# Patient Record
Sex: FEMALE | Race: WHITE | NOT HISPANIC OR LATINO | Employment: OTHER | URBAN - METROPOLITAN AREA
[De-identification: names, ages, dates, MRNs, and addresses within clinical notes are randomized per-mention and may not be internally consistent; named-entity substitution may affect disease eponyms.]

---

## 2018-06-18 ENCOUNTER — TELEPHONE (OUTPATIENT)
Dept: NEUROSURGERY | Facility: CLINIC | Age: 58
End: 2018-06-18

## 2018-06-18 ENCOUNTER — OFFICE VISIT (OUTPATIENT)
Dept: NEUROSURGERY | Facility: CLINIC | Age: 58
End: 2018-06-18
Payer: MEDICARE

## 2018-06-18 VITALS
HEART RATE: 80 BPM | TEMPERATURE: 99 F | DIASTOLIC BLOOD PRESSURE: 93 MMHG | WEIGHT: 151.69 LBS | SYSTOLIC BLOOD PRESSURE: 191 MMHG

## 2018-06-18 DIAGNOSIS — Q28.2 CEREBRAL ARTERIOVENOUS MALFORMATION: Primary | ICD-10-CM

## 2018-06-18 DIAGNOSIS — Q28.2 CEREBRAL ARTERIOVENOUS MALFORMATION (AVM): ICD-10-CM

## 2018-06-18 DIAGNOSIS — I10 ESSENTIAL HYPERTENSION: ICD-10-CM

## 2018-06-18 DIAGNOSIS — Q28.2 CEREBRAL ARTERIOVENOUS MALFORMATION: ICD-10-CM

## 2018-06-18 DIAGNOSIS — M54.16 LUMBAR RADICULOPATHY: Primary | ICD-10-CM

## 2018-06-18 DIAGNOSIS — G95.20 CERVICAL CORD COMPRESSION WITH MYELOPATHY: ICD-10-CM

## 2018-06-18 DIAGNOSIS — G95.89 MYELOMALACIA OF CERVICAL CORD: ICD-10-CM

## 2018-06-18 PROCEDURE — 99203 OFFICE O/P NEW LOW 30 MIN: CPT | Mod: PBBFAC | Performed by: NEUROLOGICAL SURGERY

## 2018-06-18 PROCEDURE — 99204 OFFICE O/P NEW MOD 45 MIN: CPT | Mod: S$PBB,,, | Performed by: NEUROLOGICAL SURGERY

## 2018-06-18 PROCEDURE — 99999 PR PBB SHADOW E&M-NEW PATIENT-LVL III: CPT | Mod: PBBFAC,,, | Performed by: NEUROLOGICAL SURGERY

## 2018-06-18 RX ORDER — NORTRIPTYLINE HYDROCHLORIDE 10 MG/1
CAPSULE ORAL
Refills: 3 | COMMUNITY
Start: 2018-03-13 | End: 2018-07-23

## 2018-06-18 RX ORDER — FLURBIPROFEN 100 MG/1
100 TABLET, FILM COATED ORAL 2 TIMES DAILY
Refills: 2 | COMMUNITY
Start: 2018-03-21 | End: 2018-07-23

## 2018-06-18 RX ORDER — ORPHENADRINE CITRATE 100 MG/1
100 TABLET, EXTENDED RELEASE ORAL 2 TIMES DAILY
Refills: 0 | COMMUNITY
Start: 2018-04-30 | End: 2018-07-23

## 2018-06-18 RX ORDER — CARISOPRODOL 350 MG/1
350 TABLET ORAL 3 TIMES DAILY
Refills: 2 | COMMUNITY
Start: 2018-05-05 | End: 2018-07-23

## 2018-06-18 RX ORDER — CEFDINIR 300 MG/1
300 CAPSULE ORAL 2 TIMES DAILY
Refills: 0 | COMMUNITY
Start: 2018-06-13 | End: 2018-07-23

## 2018-06-18 RX ORDER — DIAZEPAM 10 MG/1
TABLET ORAL
Refills: 0 | COMMUNITY
Start: 2018-05-16 | End: 2018-07-23

## 2018-06-18 NOTE — PROGRESS NOTES
History & Physical    I, Zoran Rodriguez, attest that this documentation has been prepared under the direction and in the presence of Rico Marion MD.    06/18/2018    Chief Complaint   Patient presents with    Consult       History of Present Illness:  Ida Garduno is a 57 y.o. patient with history of hypertension, smoking, chronic back pain, chronic neck pain. Patient was referred to me by Dr. Timoteo Negrete MD for evaluation of her cervical spine and lumbar spine. Patient reports her symptoms as headache, neck pain, back pain.     Patient reports constant, daily headache located to her occipital area. Patient denies any positional component or exertional component. She states that her headache has been present for 4 months, with immediate onset after she went for lumbar epidural steroid injections for back pain. Patient denies any valsalva headaches.      Patient also complains of neck pain, described as sharp shooting pain that radiates down her left shoulder and down her left scapular area. Patient denies any hand numbness. No alleviating factors are identifiable by the patient. Exacerbating factors include activities such as mopping and sweeping, which worsen both her neck pain and back pain. Past treatments include physical therapy. Patient has taken Narcotics in the past, but is no longer being prescribed narcotics. Associated signs and symptoms are positive for dropping objects from her left hand only for the last few years, positive for gait instability. Patient denies any falling within the last 6 months but endorses multiple near falls. She denies noticing any sloppier handwriting (patient is right hand dominant). Patient denies any bowel / bladder incontinence.     Patient reports history of previous back surgery in 1999. Patient claims that this was a L4-5 fusion. No history of previous neck surgery. Patient reports history of smoking (less than 1 ppd).     Review of patient's allergies  indicates:   Allergen Reactions    Codeine      Other reaction(s): Unknown    Pheniramine        Current Outpatient Prescriptions   Medication Sig Dispense Refill    cefdinir (OMNICEF) 300 MG capsule Take 300 mg by mouth 2 (two) times daily.  0    carisoprodol (SOMA) 350 MG tablet Take 350 mg by mouth 3 (three) times daily.  2    diazePAM (VALIUM) 10 MG Tab TAKE ONE TABLET BY MOUTH 30 MINUTES PRIOR TO MRI PROCEDURE  0    flurbiprofen (ANSAID) 100 MG tablet Take 100 mg by mouth 2 (two) times daily.  2    nortriptyline (PAMELOR) 10 MG capsule TAKE 1 TAB every night at bedtime for 3 days, THEN 1 TAB TWICE DAILY for 3 days, THEN 1 TAB THREE TIMES DAILY  3    orphenadrine (NORFLEX) 100 mg tablet Take 100 mg by mouth 2 (two) times daily.  0     No current facility-administered medications for this visit.        History reviewed. No pertinent past medical history.    Past Surgical History:   Procedure Laterality Date    APPENDECTOMY      13yrs     HIP ARTHROSCOPY W/ LABRAL REPAIR Right 2016    HYSTERECTOMY  2007    KIDNEY STONE SURGERY  1980    SPINE SURGERY  1999       History reviewed. No pertinent family history.    Social History   Substance Use Topics    Smoking status: Current Some Day Smoker     Packs/day: 1.00     Types: Cigarettes     Start date: 6/18/1982    Smokeless tobacco: Current User    Alcohol use 3.0 oz/week     5 Shots of liquor per week      Comment: social         Review of Systems:  Review of Systems   Constitutional: Negative for activity change.   HENT: Negative for congestion.    Eyes: Negative for discharge.   Respiratory: Negative for apnea.    Cardiovascular: Negative for chest pain.   Gastrointestinal: Negative for abdominal distention and constipation.   Endocrine: Negative for cold intolerance.   Genitourinary: Negative for difficulty urinating and enuresis.   Musculoskeletal: Positive for back pain and neck pain.   Neurological: Positive for headaches. Negative for dizziness  and weakness.        Patient complains of sharp shooting pain to her left shoulder and scapular area.    Psychiatric/Behavioral: Negative for agitation.       Vital Signs (Most Recent)  Temp: 98.5 °F (36.9 °C) (06/18/18 0836)  Pulse: 80 (06/18/18 0836)  BP: (!) 191/93 (06/18/18 0836)     68.8 kg (151 lb 11.2 oz)       Physical Exam:  Physical Exam:    Constitutional: She appears well-developed.     Eyes: Pupils are equal, round, and reactive to light. EOM are normal. Right eye exhibits no discharge. Left eye exhibits no discharge.     Abdominal: Soft.     Skin: Skin displays no rash on trunk and no rash on extremities.     Psych/Behavior: She is alert. She is oriented to person, place, and time.     Musculoskeletal:        Neck: There is no tenderness.        Back: Range of motion is full.        Right Upper Extremities: Range of motion is full. Muscle strength is 5/5. Tone is normal.        Left Upper Extremities: Range of motion is full. Muscle strength is 5/5. Tone is normal.       Right Lower Extremities: Range of motion is full. Muscle strength is 5/5. Tone is normal.        Left Lower Extremities: Range of motion is full. Muscle strength is 5/5. Tone is normal.     Neurological:        Coordination: She has an abnormal Romberg Test. She has normal finger to nose coordination and normal tandem walking coordination.        Sensory: There is no sensory deficit in the trunk. There is no sensory deficit in the extremities.        DTRs: DTRs are DTRS NORMAL AND SYMMETRICnormal and symmetric. Tricep reflexes are 2+ on the right side and 2+ on the left side. Bicep reflexes are 2+ on the right side and 2+ on the left side. Brachioradialis reflexes are 2+ on the right side and 2+ on the left side. Patellar reflexes are 2+ on the right side and 2+ on the left side. Achilles reflexes are 2+ on the right side and 2+ on the left side. She displays no Babinski's sign on the right side. She displays no Babinski's sign on the  left side.        Cranial nerves: Cranial nerve(s) II, III, IV, V, VI, VII, VIII, IX, X, XI and XII are intact.     Positive Myers's on the left side.   Positive Romberg.   Normal tandem gait.   Positive straight leg raising test on the right side.   Negative straight leg raising test on the left.   Negative Juan's test bilaterally.   5/5 strength throughout.     Negative clonus bilaterally   Negative Babinski's bilaterally.   Sensation intact to light touch throughout bilateral lower extremities.   Negative SI joint pain.   No dysmetria.       Laboratory  none    Diagnostic Results:  MRI: Reviewed   MRI Brain and Cervical spine: I have personally reviewed images and explained them to the patient.     MRI of the brain shows large AVM in the superior aspect of vermis right at the quadrigeminal system.     MRI of the cervical spine shows C2-3, C3-4, C4-5, C5-6, C6-7 spondylosis with worse cervical stenosis at C4-5 with early myelomalacia changes over the left-sided spinal cord.     Patient does not have any images of her lumbar spine.     ASSESSMENT/PLAN:       ICD-10-CM ICD-9-CM   1. Cerebral arteriovenous malformation (AVM) Q28.2 747.81   2. Cervical cord compression with myelopathy G95.20 336.9   3. Myelomalacia of cervical cord G95.89 336.8   4. Essential hypertension I10 401.9       PLAN:    1. Cerebellar AVM, non-ruptured.     I have discussed the natural history of brain arteriovenous malformations, its 2% risk of rupture per year and the 10% mortality rate and 30% morbidity rate with each rupture. Having 57 years of age, the patient is at 20 % risk of rupture in the rest of her life.     At this point, I recommend cerebral angiogram to better study this AVM and let the patient decide if she wants to pursue treatment.     2. Consent is provided for a cerebral angiogram, for evaluation of cerebellar AVM.    Risks / Benefits of treatment VS no treatment were discussed in layman's terms. Risks included but  are not limited to: bleeding, infection, loss of limb, abdominal hemorrhage, retroperitoneal hematoma, renal failure, speech and vision deficits, stroke, brain hemorrhage, paralysis, coma and death.     Ample time was provided for question.     Opportunity for a second opinion was given.     The patient / family wishes to proceed at this point.     3. Cervical spondylosis with early myelopathy symptoms. Patient with C2-3, C3-4, C4-5, C5-6, C6-7 spondylosis with worse cervical stenosis at C4-5 with early myelomalacia changes over the left-sided spinal cord.     I have explained the natural history of spondylotic myelopathy, the stepwise decline in neurological function over time, and the role of surgery to prevent further neurological decline but not to improve the already lost neurological functions.  Patient understands this natural history, and is aware of signs and symptoms of progression, including worsening gait and balance, loss of bb function, weakness, loss of fine motor skills.      Patient has an orthopedic surgeon following for early myelopathy. At this point, she will continue to follow up with her orthopedic surgeon.    4. S/p lumbar spinal fusion. At this point, we will get an MRI without contrast of the lumbar spine.     5. I spent 35 minutes with the patient, 50% of time in counselingabout the above mentioned issues.    6. Patient to f/u with PCP for BP control. Ideally, SBP will need to be below 160.        [place] was seen today for consult.    Diagnoses and all orders for this visit:    Cerebral arteriovenous malformation (AVM)    Cervical cord compression with myelopathy    Myelomalacia of cervical cord    Essential hypertension    I, Dr. Rico Marion, personally performed the services described in this documentation. All medical record entries made by the scribe were at my direction and in my presence.  I have reviewed the chart and agree that the record reflects my personal performance  and is accurate and complete. Rico Marion MD.  9:06 PM 06/18/2018

## 2018-06-18 NOTE — LETTER
June 18, 2018      Timoteo Negrete MD  [address] [place] [address]           [facility] - Neurosurgery 7th Fl  [address]  [place] [address]  Phone: [phone]          Patient: Ida Garduno   MR Number: [id number]   YOB: 1960   Date of Visit: 6/18/2018       Dear Dr. Timoteo Negrete:    Thank you for referring Ida Garduno to me for evaluation. Attached you will find relevant portions of my assessment and plan of care.    If you have questions, please do not hesitate to call me. I look forward to following Ida Garduno along with you.    Sincerely,    Rico Marion MD    Enclosure  CC:  No Recipients    If you would like to receive this communication electronically, please contact [email or web address] or [phone] to request more information on [employer] access.    For providers and/or their staff who would like to refer a patient to [facility], please contact us through our one-stop-shop provider referral line, [facility] , at [phone].    If you feel you have received this communication in error or would no longer like to receive these types of communications, please e-mail [email or web address]

## 2018-07-05 ENCOUNTER — HOSPITAL ENCOUNTER (OUTPATIENT)
Facility: HOSPITAL | Age: 58
Discharge: HOME | End: 2018-07-05
Attending: NEUROLOGICAL SURGERY | Admitting: NEUROLOGICAL SURGERY
Payer: MEDICARE

## 2018-07-05 ENCOUNTER — HOSPITAL ENCOUNTER (OUTPATIENT)
Dept: RADIOLOGY | Facility: HOSPITAL | Age: 58
Discharge: HOME | End: 2018-07-05
Attending: NEUROLOGICAL SURGERY
Payer: MEDICARE

## 2018-07-05 VITALS
SYSTOLIC BLOOD PRESSURE: 141 MMHG | DIASTOLIC BLOOD PRESSURE: 64 MMHG | RESPIRATION RATE: 18 BRPM | BODY MASS INDEX: 24.99 KG/M2 | OXYGEN SATURATION: 95 % | WEIGHT: 150 LBS | TEMPERATURE: 98 F | HEIGHT: 65 IN | HEART RATE: 90 BPM

## 2018-07-05 DIAGNOSIS — Q28.2 CEREBRAL ARTERIOVENOUS MALFORMATION: ICD-10-CM

## 2018-07-05 DIAGNOSIS — M54.16 LUMBAR RADICULOPATHY: ICD-10-CM

## 2018-07-05 LAB
ALBUMIN SERPL BCP-MCNC: 3.4 G/DL
ALP SERPL-CCNC: 107 U/L
ALT SERPL W/O P-5'-P-CCNC: 17 U/L
ANION GAP SERPL CALC-SCNC: 7 MMOL/L
ANISOCYTOSIS BLD QL SMEAR: SLIGHT
AST SERPL-CCNC: 16 U/L
BASOPHILS # BLD AUTO: 0.04 K/UL
BASOPHILS NFR BLD: 0.6 %
BILIRUB SERPL-MCNC: 0.9 MG/DL
BUN SERPL-MCNC: 13 MG/DL
CALCIUM SERPL-MCNC: 8.6 MG/DL
CHLORIDE SERPL-SCNC: 108 MMOL/L
CO2 SERPL-SCNC: 27 MMOL/L
CREAT SERPL-MCNC: 0.6 MG/DL
DIFFERENTIAL METHOD: ABNORMAL
EOSINOPHIL # BLD AUTO: 0.2 K/UL
EOSINOPHIL NFR BLD: 3.2 %
ERYTHROCYTE [DISTWIDTH] IN BLOOD BY AUTOMATED COUNT: 13.5 %
EST. GFR  (AFRICAN AMERICAN): >60 ML/MIN/1.73 M^2
EST. GFR  (NON AFRICAN AMERICAN): >60 ML/MIN/1.73 M^2
GLUCOSE SERPL-MCNC: 108 MG/DL
HCT VFR BLD AUTO: 44.6 %
HGB BLD-MCNC: 15 G/DL
IMM GRANULOCYTES # BLD AUTO: 0.01 K/UL
IMM GRANULOCYTES NFR BLD AUTO: 0.2 %
INR PPP: 1.1
LYMPHOCYTES # BLD AUTO: 1.7 K/UL
LYMPHOCYTES NFR BLD: 25.3 %
MCH RBC QN AUTO: 31.6 PG
MCHC RBC AUTO-ENTMCNC: 33.6 G/DL
MCV RBC AUTO: 94 FL
MONOCYTES # BLD AUTO: 0.6 K/UL
MONOCYTES NFR BLD: 9 %
NEUTROPHILS # BLD AUTO: 4.1 K/UL
NEUTROPHILS NFR BLD: 61.7 %
NRBC BLD-RTO: 0 /100 WBC
PLATELET # BLD AUTO: 208 K/UL
PLATELET BLD QL SMEAR: ABNORMAL
PMV BLD AUTO: 10.6 FL
POTASSIUM SERPL-SCNC: 3.5 MMOL/L
PROT SERPL-MCNC: 6.1 G/DL
PROTHROMBIN TIME: 11.7 SEC
RBC # BLD AUTO: 4.74 M/UL
SODIUM SERPL-SCNC: 142 MMOL/L
WBC # BLD AUTO: 6.64 K/UL

## 2018-07-05 PROCEDURE — 85610 PROTHROMBIN TIME: CPT

## 2018-07-05 PROCEDURE — 25500020 PHARM REV CODE 255: Performed by: NEUROLOGICAL SURGERY

## 2018-07-05 PROCEDURE — 85025 COMPLETE CBC W/AUTO DIFF WBC: CPT

## 2018-07-05 PROCEDURE — 72148 MRI LUMBAR SPINE W/O DYE: CPT | Mod: TC

## 2018-07-05 PROCEDURE — 80053 COMPREHEN METABOLIC PANEL: CPT

## 2018-07-05 PROCEDURE — 63600175 PHARM REV CODE 636 W HCPCS: Performed by: FAMILY MEDICINE

## 2018-07-05 PROCEDURE — 72148 MRI LUMBAR SPINE W/O DYE: CPT | Mod: 26,,, | Performed by: RADIOLOGY

## 2018-07-05 PROCEDURE — 63600175 PHARM REV CODE 636 W HCPCS: Performed by: NEUROLOGICAL SURGERY

## 2018-07-05 RX ORDER — FENTANYL CITRATE 50 UG/ML
50 INJECTION, SOLUTION INTRAMUSCULAR; INTRAVENOUS
Status: DISCONTINUED | OUTPATIENT
Start: 2018-07-05 | End: 2018-07-06 | Stop reason: HOSPADM

## 2018-07-05 RX ORDER — HEPARIN SODIUM 1000 [USP'U]/ML
3000 INJECTION, SOLUTION INTRAVENOUS; SUBCUTANEOUS ONCE
Status: DISCONTINUED | OUTPATIENT
Start: 2018-07-05 | End: 2018-07-06 | Stop reason: HOSPADM

## 2018-07-05 RX ORDER — HYDRALAZINE HYDROCHLORIDE 20 MG/ML
INJECTION INTRAMUSCULAR; INTRAVENOUS CODE/TRAUMA/SEDATION MEDICATION
Status: COMPLETED | OUTPATIENT
Start: 2018-07-05 | End: 2018-07-05

## 2018-07-05 RX ORDER — IODIXANOL 320 MG/ML
200 INJECTION, SOLUTION INTRAVASCULAR
Status: COMPLETED | OUTPATIENT
Start: 2018-07-05 | End: 2018-07-05

## 2018-07-05 RX ORDER — MIDAZOLAM HYDROCHLORIDE 1 MG/ML
1 INJECTION INTRAMUSCULAR; INTRAVENOUS
Status: DISCONTINUED | OUTPATIENT
Start: 2018-07-05 | End: 2018-07-06 | Stop reason: HOSPADM

## 2018-07-05 RX ORDER — SODIUM CHLORIDE 9 MG/ML
500 INJECTION, SOLUTION INTRAVENOUS ONCE
Status: DISCONTINUED | OUTPATIENT
Start: 2018-07-05 | End: 2018-07-06 | Stop reason: HOSPADM

## 2018-07-05 RX ORDER — FENTANYL CITRATE 50 UG/ML
INJECTION, SOLUTION INTRAMUSCULAR; INTRAVENOUS CODE/TRAUMA/SEDATION MEDICATION
Status: COMPLETED | OUTPATIENT
Start: 2018-07-05 | End: 2018-07-05

## 2018-07-05 RX ORDER — MIDAZOLAM HYDROCHLORIDE 1 MG/ML
INJECTION INTRAMUSCULAR; INTRAVENOUS CODE/TRAUMA/SEDATION MEDICATION
Status: COMPLETED | OUTPATIENT
Start: 2018-07-05 | End: 2018-07-05

## 2018-07-05 RX ORDER — SODIUM CHLORIDE 0.9 % (FLUSH) 0.9 %
5 SYRINGE (ML) INJECTION
Status: DISCONTINUED | OUTPATIENT
Start: 2018-07-05 | End: 2018-07-06 | Stop reason: HOSPADM

## 2018-07-05 RX ADMIN — MIDAZOLAM HYDROCHLORIDE 1 MG: 1 INJECTION, SOLUTION INTRAMUSCULAR; INTRAVENOUS at 08:07

## 2018-07-05 RX ADMIN — HYDRALAZINE HYDROCHLORIDE 10 MG: 20 INJECTION INTRAMUSCULAR; INTRAVENOUS at 08:07

## 2018-07-05 RX ADMIN — FENTANYL CITRATE 50 MCG: 50 INJECTION, SOLUTION INTRAMUSCULAR; INTRAVENOUS at 08:07

## 2018-07-05 RX ADMIN — FENTANYL CITRATE 50 MCG: 50 INJECTION, SOLUTION INTRAMUSCULAR; INTRAVENOUS at 09:07

## 2018-07-05 RX ADMIN — MIDAZOLAM HYDROCHLORIDE 1 MG: 1 INJECTION, SOLUTION INTRAMUSCULAR; INTRAVENOUS at 09:07

## 2018-07-05 RX ADMIN — IODIXANOL 110 ML: 320 INJECTION, SOLUTION INTRAVASCULAR at 09:07

## 2018-07-05 RX ADMIN — HYDRALAZINE HYDROCHLORIDE 10 MG: 20 INJECTION INTRAMUSCULAR; INTRAVENOUS at 09:07

## 2018-07-05 NOTE — PROGRESS NOTES
Pt arrived to [facility] bay 1 s/p cerebral angiogram.  NAD noted.  Report received from ISAAC Velez.  DSG to left groin CDI.  Will continue to monitor.

## 2018-07-05 NOTE — DISCHARGE INSTRUCTIONS
For scheduling: Call Elizabeth at [phone]    For questions or concerns call: CARLA MON-FRI 8 AM- 5PM [phone]. Radiology resident on call [phone].    For immediate concerns that are not emergent, you may call our radiology clinic at: [phone]

## 2018-07-05 NOTE — H&P
[facility]  Neurosurgery  History & Physical    Patient Name: Ida Garduno  MRN: [id number]      Subjective:     Chief Complaint/Reason for Admission: AVM    History of Present Illness:   Ida Garduno is a 57 y.o. patient with history of hypertension, smoking, chronic back pain, chronic neck pain. Patient was referred to me by Dr. Timoteo Negrete MD for evaluation of her cervical spine and lumbar spine. Patient reports her symptoms as headache, neck pain, back pain.   Patient reports constant, daily headache located to her occipital area. Patient denies any positional component or exertional component. She states that her headache has been present for 4 months, with immediate onset after she went for lumbar epidural steroid injections for back pain. Patient denies any valsalva headaches.  Pt also with c/o for dropping objects from her left hand only for the last few years, positive for gait instability. Patient denies any falling within the last 6 months but endorses multiple near falls. She denies noticing any sloppier handwriting (patient is right hand dominant). Patient denies any bowel / bladder incontinence.     Pt without any new complaints since last visit with Dr. Garcia    PTA Medications   Medication Sig    carisoprodol (SOMA) 350 MG tablet Take 350 mg by mouth 3 (three) times daily.    cefdinir (OMNICEF) 300 MG capsule Take 300 mg by mouth 2 (two) times daily.    diazePAM (VALIUM) 10 MG Tab TAKE ONE TABLET BY MOUTH 30 MINUTES PRIOR TO MRI PROCEDURE    flurbiprofen (ANSAID) 100 MG tablet Take 100 mg by mouth 2 (two) times daily.    nortriptyline (PAMELOR) 10 MG capsule TAKE 1 TAB every night at bedtime for 3 days, THEN 1 TAB TWICE DAILY for 3 days, THEN 1 TAB THREE TIMES DAILY    orphenadrine (NORFLEX) 100 mg tablet Take 100 mg by mouth 2 (two) times daily.       Review of patient's allergies indicates:   Allergen Reactions    Codeine      Other reaction(s):  Unknown    Pheniramine        No past medical history on file.  Past Surgical History:   Procedure Laterality Date    APPENDECTOMY      13yrs     HIP ARTHROSCOPY W/ LABRAL REPAIR Right 2016    HYSTERECTOMY  2007    KIDNEY STONE SURGERY  1980    SPINE SURGERY  1999     Family History     None        Social History Main Topics    Smoking status: Current Some Day Smoker     Packs/day: 1.00     Types: Cigarettes     Start date: 6/18/1982    Smokeless tobacco: Current User    Alcohol use 3.0 oz/week     5 Shots of liquor per week      Comment: social     Drug use: No    Sexual activity: Yes     Partners: Male     Review of Systems   Review of Systems   Constitutional: Negative for activity change.   HENT: Negative for congestion.    Eyes: Negative for discharge.   Respiratory: Negative for apnea.    Cardiovascular: Negative for chest pain.   Gastrointestinal: Negative for abdominal distention and constipation.   Endocrine: Negative for cold intolerance.   Genitourinary: Negative for difficulty urinating and enuresis.   Musculoskeletal: Positive for back pain and neck pain.   Neurological: Positive for headaches. Negative for dizziness and weakness.        Patient complains of sharp shooting pain to her left shoulder and scapular area.    Psychiatric/Behavioral: Negative for agitation.     Objective:     Weight: 68 kg (150 lb)  Body mass index is 24.96 kg/m².  Vital Signs (Most Recent):    Vital Signs (24h Range):                         Neurosurgery Physical Exam   Constitutional: She appears well-developed.      Eyes: Pupils are equal, round, and reactive to light. EOM are normal. Right eye exhibits no discharge. Left eye exhibits no discharge.      Abdominal: Soft.      Skin: Skin displays no rash on trunk and no rash on extremities.     Psych/Behavior: She is alert. She is oriented to person, place, and time.     Musculoskeletal:        Neck: There is no tenderness.        Back: Range of motion is full.         Right Upper Extremities: Range of motion is full. Muscle strength is 5/5. Tone is normal.        Left Upper Extremities: Range of motion is full. Muscle strength is 5/5. Tone is normal.       Right Lower Extremities: Range of motion is full. Muscle strength is 5/5. Tone is normal.        Left Lower Extremities: Range of motion is full. Muscle strength is 5/5. Tone is normal.     Neurological:        Coordination: She has an abnormal Romberg Test. She has normal finger to nose coordination and normal tandem walking coordination.        Sensory: There is no sensory deficit in the trunk. There is no sensory deficit in the extremities.        DTRs: DTRs are DTRS NORMAL AND SYMMETRICnormal and symmetric. Tricep reflexes are 2+ on the right side and 2+ on the left side. Bicep reflexes are 2+ on the right side and 2+ on the left side. Brachioradialis reflexes are 2+ on the right side and 2+ on the left side. Patellar reflexes are 2+ on the right side and 2+ on the left side. Achilles reflexes are 2+ on the right side and 2+ on the left side. She displays no Babinski's sign on the right side. She displays no Babinski's sign on the left side.        Cranial nerves: Cranial nerve(s) II, III, IV, V, VI, VII, VIII, IX, X, XI and XII are intact.      Positive Myers's on the left side.   Positive Romberg.   Normal tandem gait.   Positive straight leg raising test on the right side.   Negative straight leg raising test on the left.   Negative Juan's test bilaterally.   5/5 strength throughout.      Negative clonus bilaterally   Negative Babinski's bilaterally.   Sensation intact to light touch throughout bilateral lower extremities.   Negative SI joint pain.   No dysmetria.         Assessment/Plan:     Active Diagnoses:    Diagnosis Date Noted POA    Cerebral arteriovenous malformation [Q28.2] 07/05/2018 Not Applicable      Problems Resolved During this Admission:    Diagnosis Date Noted Date Resolved POA     56 yo female  with non ruptured Cerebellar AVM   Mallampati 2  ASA 2    - Pt is neurologically stable  - Diagnostic cerebral angiogram today  - CBC, CMP, INR this morning prior to anigogram  - Discussed with ARINA Sahni  Neurosurgery  [facility]

## 2018-07-05 NOTE — H&P
Radiology History & Physical      SUBJECTIVE:     Chief Complaint: headache and neck pain     History of Present Illness:  Ida Garduno is a 57 y.o. female with cerebellar AVM found during work-up of above symptoms who presents for diagnostic cerebral angiogram.  No past medical history on file.  Past Surgical History:   Procedure Laterality Date    APPENDECTOMY      13yrs     HIP ARTHROSCOPY W/ LABRAL REPAIR Right 2016    HYSTERECTOMY  2007    KIDNEY STONE SURGERY  1980    SPINE SURGERY  1999       Home Meds:   Prior to Admission medications    Medication Sig Start Date End Date Taking? Authorizing Provider   carisoprodol (SOMA) 350 MG tablet Take 350 mg by mouth 3 (three) times daily. 5/5/18   Historical Provider, MD   cefdinir (OMNICEF) 300 MG capsule Take 300 mg by mouth 2 (two) times daily. 6/13/18   Historical Provider, MD   diazePAM (VALIUM) 10 MG Tab TAKE ONE TABLET BY MOUTH 30 MINUTES PRIOR TO MRI PROCEDURE 5/16/18   Historical Provider, MD   flurbiprofen (ANSAID) 100 MG tablet Take 100 mg by mouth 2 (two) times daily. 3/21/18   Historical Provider, MD   nortriptyline (PAMELOR) 10 MG capsule TAKE 1 TAB every night at bedtime for 3 days, THEN 1 TAB TWICE DAILY for 3 days, THEN 1 TAB THREE TIMES DAILY 3/13/18   Historical Provider, MD   orphenadrine (NORFLEX) 100 mg tablet Take 100 mg by mouth 2 (two) times daily. 4/30/18   Historical Provider, MD     Anticoagulants/Antiplatelets: no anticoagulation    Allergies:   Review of patient's allergies indicates:   Allergen Reactions    Codeine      Other reaction(s): Unknown    Pheniramine      Sedation History:  no adverse reactions    Review of Systems:   Hematological: no known coagulopathies  Respiratory: no shortness of breath  Cardiovascular: no chest pain  Gastrointestinal: no abdominal pain  Genito-Urinary: no dysuria  Musculoskeletal:   Back pain and nekc apn  Neurological: no TIA or stroke symptoms, +headaches         OBJECTIVE:     Vital  Signs (Most Recent)       Physical Exam:  ASA: 2  Mallampati: 3    General: no acute distress  Mental Status: alert and oriented to person, place and time  Neuro:extraocular eye movements intact, pupils equal round and reactive; no facial asymmetry, 5/5 strength upper and lower extremities  HEENT: normocephalic, atraumatic  Chest: unlabored breathing  Heart: regular heart rate  Abdomen: nondistended  Extremity: moves all extremities    Laboratory  No results found for: INR  No results found for: WBC, HGB, HCT, MCV, PLT No results found for: GLU, NA, K, CL, CO2, BUN, CREATININE, CALCIUM, MG, ALT, AST, ALBUMIN, BILITOT, BILIDIR    ASSESSMENT/PLAN:     Sedation Plan: moderate  Patient will undergo diagnostic cerebral angiogram.    Faizan Toledo  Radiology PGY-5

## 2018-07-05 NOTE — PROGRESS NOTES
Cerebral angiogram completed, pt tolerated well. No apparent distress noted. EXOseal deployed, HOB to be elevated at 1120. Dressing applied CDI. Pt to be transferred to [facility], report to be given at bedside.

## 2018-07-05 NOTE — PROGRESS NOTES
Pt arrived to IR room 190 for cerebral angiogram, no acute distress noted. Orders and labs reviewed on chart.

## 2018-07-05 NOTE — PROCEDURES
Radiology Post-Procedure Note    Pre Op Diagnosis: cerebellar AVM    Post Op Diagnosis: Spetzler Oracio Grade 3 cerebellar AVM    Procedure: Cerebral angiogram    Procedure performed by: Dr. Rico Garcia; Fazian Toledo (resident)      Written Informed Consent Obtained: Yes    Specimen Removed: NO    Estimated Blood Loss: Minimal    Procedure report:     A 5F sheath was placed into the left femoral artery and a 5F Berensteincatheter was advanced into the aortic arch.  The common common carotid and verterbal arteries were subselected and angiography of the brain was performed after injection into each of these vessels.    Preliminary interpretation: Grade 3 cerebellar   AVM with supply from both superior cerebellar arteries and left posterior inferior cerebellar artery and venous drainage primarily via the straight sinus.  Please see Imaging report for full details.    A left femoral artery angiogram was performed, the sheath removed and hemostasis achieved using Exoseal devide.  No hematoma was present at the time of hemostasis.    The patient tolerated the procedure well.     Faizan Toledo  Radiology PGY-5

## 2018-07-05 NOTE — PROGRESS NOTES
Pre-Procedure assessment and documentation complete. Awaiting lab results to begin procedure (Pt and family aware). No complaints at this time.

## 2018-07-05 NOTE — PROGRESS NOTES
Pt given discharge instructions and handout, verbalized understanding. Family at bedside.  Dsg to left groin CDI.  IV d/c'd with cath tip intact.  NAD noted.  Pt to proceed to MRI appt. Via wheelchair.

## 2018-07-06 ENCOUNTER — TELEPHONE (OUTPATIENT)
Dept: NEUROSURGERY | Facility: CLINIC | Age: 58
End: 2018-07-06

## 2018-07-06 DIAGNOSIS — Q27.30 AVM (ARTERIOVENOUS MALFORMATION): Primary | ICD-10-CM

## 2018-07-06 DIAGNOSIS — Q28.2 AVM (ARTERIOVENOUS MALFORMATION) BRAIN: ICD-10-CM

## 2018-07-06 NOTE — DISCHARGE SUMMARY
Radiology Discharge Summary      Hospital Course: No complications    Admit Date: 7/5/2018  Discharge Date: 07/06/2018     Instructions Given to Patient: Yes  Diet: Resume prior diet  Activity: activity as tolerated    Description of Condition on Discharge: Stable  Vital Signs (Most Recent): Temp: 97.9 °F (36.6 °C) (07/05/18 0930)  Pulse: 90 (07/05/18 1130)  Resp: 18 (07/05/18 1130)  BP: (!) 141/64 (07/05/18 1130)  SpO2: 95 % (07/05/18 1130)    Discharge Disposition: Home    Discharge Diagnosis: cerebellar AVM     Follow-up: in neurosurgery clinic with Dr. Radha Toledo  Radiology PGY-5

## 2018-07-13 ENCOUNTER — HOSPITAL ENCOUNTER (OUTPATIENT)
Dept: RADIOLOGY | Facility: HOSPITAL | Age: 58
Discharge: HOME | End: 2018-07-13
Attending: NEUROLOGICAL SURGERY
Payer: MEDICARE

## 2018-07-13 DIAGNOSIS — Q28.2 AVM (ARTERIOVENOUS MALFORMATION) BRAIN: ICD-10-CM

## 2018-07-13 DIAGNOSIS — Q27.30 AVM (ARTERIOVENOUS MALFORMATION): ICD-10-CM

## 2018-07-13 PROCEDURE — 70544 MR ANGIOGRAPHY HEAD W/O DYE: CPT | Mod: TC

## 2018-07-13 PROCEDURE — 70553 MRI BRAIN STEM W/O & W/DYE: CPT | Mod: TC

## 2018-07-13 PROCEDURE — A9585 GADOBUTROL INJECTION: HCPCS | Performed by: NEUROLOGICAL SURGERY

## 2018-07-13 PROCEDURE — 25500020 PHARM REV CODE 255: Performed by: NEUROLOGICAL SURGERY

## 2018-07-13 PROCEDURE — 70553 MRI BRAIN STEM W/O & W/DYE: CPT | Mod: 26,,, | Performed by: RADIOLOGY

## 2018-07-13 RX ORDER — GADOBUTROL 604.72 MG/ML
INJECTION INTRAVENOUS
Status: DISPENSED
Start: 2018-07-13 | End: 2018-07-13

## 2018-07-13 RX ORDER — GADOBUTROL 604.72 MG/ML
6 INJECTION INTRAVENOUS
Status: COMPLETED | OUTPATIENT
Start: 2018-07-13 | End: 2018-07-13

## 2018-07-13 RX ADMIN — GADOBUTROL 6 ML: 604.72 INJECTION INTRAVENOUS at 11:07

## 2018-07-16 ENCOUNTER — TELEPHONE (OUTPATIENT)
Dept: NEUROSURGERY | Facility: CLINIC | Age: 58
End: 2018-07-16

## 2018-07-16 ENCOUNTER — HOSPITAL ENCOUNTER (OUTPATIENT)
Dept: RADIOLOGY | Facility: HOSPITAL | Age: 58
Discharge: HOME | End: 2018-07-16
Attending: NEUROLOGICAL SURGERY
Payer: MEDICARE

## 2018-07-16 ENCOUNTER — CLINICAL SUPPORT (OUTPATIENT)
Dept: NEUROSURGERY | Facility: CLINIC | Age: 58
End: 2018-07-16
Payer: MEDICARE

## 2018-07-16 VITALS
WEIGHT: 154.31 LBS | TEMPERATURE: 99 F | SYSTOLIC BLOOD PRESSURE: 154 MMHG | HEART RATE: 79 BPM | BODY MASS INDEX: 25.68 KG/M2 | DIASTOLIC BLOOD PRESSURE: 85 MMHG

## 2018-07-16 DIAGNOSIS — Q28.2 AVM (ARTERIOVENOUS MALFORMATION) BRAIN: Primary | ICD-10-CM

## 2018-07-16 DIAGNOSIS — Q27.30 AVM (ARTERIOVENOUS MALFORMATION): ICD-10-CM

## 2018-07-16 DIAGNOSIS — Q28.2 CEREBRAL ARTERIOVENOUS MALFORMATION (AVM): Primary | ICD-10-CM

## 2018-07-16 DIAGNOSIS — Q28.2 AVM (ARTERIOVENOUS MALFORMATION) BRAIN: ICD-10-CM

## 2018-07-16 PROCEDURE — 70552 MRI BRAIN STEM W/DYE: CPT | Mod: 26,,, | Performed by: RADIOLOGY

## 2018-07-16 PROCEDURE — 99213 OFFICE O/P EST LOW 20 MIN: CPT | Mod: PBBFAC,25

## 2018-07-16 PROCEDURE — 25500020 PHARM REV CODE 255: Performed by: NEUROLOGICAL SURGERY

## 2018-07-16 PROCEDURE — 99999 PR PBB SHADOW E&M-EST. PATIENT-LVL III: CPT | Mod: PBBFAC,,,

## 2018-07-16 PROCEDURE — A9585 GADOBUTROL INJECTION: HCPCS | Performed by: NEUROLOGICAL SURGERY

## 2018-07-16 PROCEDURE — 70552 MRI BRAIN STEM W/DYE: CPT | Mod: TC

## 2018-07-16 RX ORDER — GADOBUTROL 604.72 MG/ML
8 INJECTION INTRAVENOUS
Status: COMPLETED | OUTPATIENT
Start: 2018-07-16 | End: 2018-07-16

## 2018-07-16 RX ADMIN — GADOBUTROL 8 ML: 604.72 INJECTION INTRAVENOUS at 04:07

## 2018-07-16 NOTE — TELEPHONE ENCOUNTER
"----- Message from Rico Marion MD sent at 7/14/2018 11:11 PM CDT -----  Hi [facility],    Can you guys help to protocol the MR for AVM. I've been ordering an MRA and MRI w/wo for AVM planning.   I really need the MRA part to make the assessment if the patient is a good candidate for radiosurgery and the "MRI stealth" to do the planning of the radiation the day of the radiation (need the FSPGR / 3d MPRAGE sequence including the nose in the scan).     This patient here did not get the MRI stealth (no FSPGR / 3d MPRAGE sequence on her) even though it was ordered MRI with and without (so they can include the FSPGR / 3d MPRAGE).    Also, on this patient, can we have her coming back for the FSPGR / 3d MPRAGE sequence?    Thanks    E      "

## 2018-07-16 NOTE — PROGRESS NOTES
Wound Check   Neurosurgery      Ms. Ida Nieves is a pleasant 57 y.o. female s/p left diagnostic angiogram with Dr. Garcia on 7/5/18 for AVM. (For complete diagnosis and procedure, see OP note.) She presents to clinic today for her 2 week wound check. Pt has a steady gait, normal affect, is in NAD. Denies fevers, chills, night sweats, back pain or N/V.      Left groin is clean, dry and intact with no signs of erythema, ecchymosis, swelling or purulent drainage. All skin edges are completely approximated. Angio site is soft to the touch. Pt denies discomfort at incision. Femoral and pedal pulses are palpable.    Dr Garcia came in to see pt and her . Discussed treatment of the AVM with Linac Stereotactic Radiosurgery. Pt desires treatment and will be scheduled in mid-August.    I reviewed the procedure in detail with patient and her . All questions answered. Consents signed.     Encouraged patient to call if they have any questions or concerns.         Norma Machado RN  Neurosurgery  [phone]

## 2018-07-17 DIAGNOSIS — Q28.2 AVM (ARTERIOVENOUS MALFORMATION) BRAIN: Primary | ICD-10-CM

## 2018-07-17 RX ORDER — METHYLPREDNISOLONE 4 MG/1
TABLET ORAL
Qty: 1 PACKAGE | Refills: 0 | Status: CANCELLED | OUTPATIENT
Start: 2018-07-17 | End: 2018-08-07

## 2018-07-23 ENCOUNTER — INITIAL CONSULT (OUTPATIENT)
Dept: RADIATION ONCOLOGY | Facility: CLINIC | Age: 58
End: 2018-07-23
Payer: MEDICARE

## 2018-07-23 VITALS
BODY MASS INDEX: 26 KG/M2 | HEART RATE: 78 BPM | RESPIRATION RATE: 16 BRPM | DIASTOLIC BLOOD PRESSURE: 88 MMHG | SYSTOLIC BLOOD PRESSURE: 186 MMHG | WEIGHT: 156.06 LBS | HEIGHT: 65 IN

## 2018-07-23 DIAGNOSIS — Q28.2 CEREBRAL ARTERIOVENOUS MALFORMATION (AVM): Primary | ICD-10-CM

## 2018-07-23 PROCEDURE — 99999 PR PBB SHADOW E&M-EST. PATIENT-LVL III: CPT | Mod: PBBFAC,,, | Performed by: RADIOLOGY

## 2018-07-23 PROCEDURE — 99203 OFFICE O/P NEW LOW 30 MIN: CPT | Mod: S$PBB,,, | Performed by: RADIOLOGY

## 2018-07-23 PROCEDURE — 99213 OFFICE O/P EST LOW 20 MIN: CPT | Mod: PBBFAC | Performed by: RADIOLOGY

## 2018-07-23 NOTE — PROGRESS NOTES
HISTORY OF PRESENT ILLNESS:   This patient presents for discussion of stereotactic radiosurgery for treatment of AVM.     Ms. Garduno recently presented to her physicians in [facility] for evaluation of a persistent headache in the occipital area of the scalp for 4 months. The patient has a history of previous back surgery and lumbar epidural injections. Further questioning revealed the patient had noted some gait instability for the last few years.  She denied any recent falls.   Work up with MRI of the brain revealed a large AVM in the superior aspect of vermis right at the quadrigeminal system. The patient was referred to S for further evaluation.  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  Large draining veins lead to the straight sinus and secondarily to the transverse sinuses, left greater than right. MRI redemonstrated the 3.4 by 1.9 by 3.5 c cm  left cerebellar arterial venous malformation with main venous drainage to the straight sinus but with secondary drain is to the transverse sinuses, left greater than right.  There was no acute hemorrhage or acute infarction.  The patient's treatment options were discussed with her.  She has elected to proceed with stereotactic radiosurgery.  She presents for discussion of radiotherapy.  Today, the patient states she feels well.  Still notes the daily headaches.        REVIEW OF SYSTEMS:   Review of Systems   Constitutional: Negative for chills, fever, malaise/fatigue and weight loss.   Gastrointestinal: Negative for constipation, nausea and vomiting.   Genitourinary: Negative for dysuria and frequency.   Neurological: Positive for headaches. Negative for dizziness, tingling, tremors, sensory change, speech change, focal weakness, seizures and weakness.         PAST MEDICAL HISTORY:  Past Medical History:    Diagnosis Date    AVM (arteriovenous malformation)        PAST SURGICAL HISTORY:  Past Surgical History:   Procedure Laterality Date    APPENDECTOMY      13yrs     HIP ARTHROSCOPY W/ LABRAL REPAIR Right 2016    HYSTERECTOMY  2007    KIDNEY STONE SURGERY      SPINE SURGERY         ALLERGIES:   Review of patient's allergies indicates:   Allergen Reactions    Codeine      Other reaction(s): Unknown    Pheniramine        MEDICATIONS:  No current outpatient prescriptions on file.     No current facility-administered medications for this visit.        SOCIAL HISTORY:  Social History     Social History    Marital status: Single     Spouse name: N/A    Number of children: N/A    Years of education: N/A     Occupational History    Single      Social History Main Topics    Smoking status: Current Some Day Smoker     Packs/day: 1.00     Types: Cigarettes     Start date: 1982    Smokeless tobacco: Never Used    Alcohol use 3.0 oz/week     5 Shots of liquor per week      Comment: social     Drug use: No    Sexual activity: Yes     Partners: Male     Other Topics Concern    Not on file     Social History Narrative    No narrative on file       FAMILY HISTORY:  History reviewed. No pertinent family history.      PHYSICAL EXAMINATION:  Vitals:    18 0957   BP: (!) 186/88   Pulse: 78   Resp: 16     Physical Exam   Constitutional: She is oriented to person, place, and time and well-developed, well-nourished, and in no distress.   Neurological: She is alert and oriented to person, place, and time. No cranial nerve deficit. Gait normal. Coordination normal. GCS score is 15.       ASSESSMENT/PLAN:  Cerebellar AVM     ECO    I discussed the rational for stereotactic radiosurgery to the feeding arteries with the patient and her .  We discussed the procedures, risks and benefits of radiotherapy.  Discussed the acute and long term side effects of therapy including damage to the brain  resulting is worsening gait instability and weakness.  The patient was agreeable to proceeding with therapy.  She will be scheduled for treatment in the next month    Psychosocial Distress screening score of Distress Score: 3 noted and reviewed. No intervention indicated.    I spent approximately 35 minutes reviewing the available records and evaluating the patient, out of which over 50% of the time was spent face to face with the patient in counseling and coordinating this patient's care.

## 2018-07-23 NOTE — LETTER
July 23, 2018      Rico Marion MD  [address] [facility] [address]           [facility] Radiation Oncology  [address]  [place] [address]  Phone: [phone]          Patient: Ida Garduno   MR Number: [id number]   YOB: 1960   Date of Visit: 7/23/2018       Dear Dr. Rico Marion:    Thank you for referring Ida Garduno to me for evaluation. Attached you will find relevant portions of my assessment and plan of care.    If you have questions, please do not hesitate to call me. I look forward to following Ida Garduno along with you.    Sincerely,    Manuel Parekh Jr., MD    Enclosure  CC:  No Recipients    If you would like to receive this communication electronically, please contact [email or web address] or [phone] to request more information on [employer] Link access.    For providers and/or their staff who would like to refer a patient to [facility], please contact us through our one-stop-shop provider referral line, [facility], at [phone].    If you feel you have received this communication in error or would no longer like to receive these types of communications, please e-mail [email or web address]

## 2018-07-24 ENCOUNTER — TELEPHONE (OUTPATIENT)
Dept: NEUROSURGERY | Facility: CLINIC | Age: 58
End: 2018-07-24

## 2018-07-24 DIAGNOSIS — Q27.30 AVM (ARTERIOVENOUS MALFORMATION): Primary | ICD-10-CM

## 2018-07-24 DIAGNOSIS — Q28.2 AVM (ARTERIOVENOUS MALFORMATION) BRAIN: ICD-10-CM

## 2018-07-25 ENCOUNTER — TELEPHONE (OUTPATIENT)
Dept: NEUROSURGERY | Facility: CLINIC | Age: 58
End: 2018-07-25

## 2018-07-25 DIAGNOSIS — Q28.2 AVM (ARTERIOVENOUS MALFORMATION) BRAIN: Primary | ICD-10-CM

## 2018-08-01 ENCOUNTER — HOSPITAL ENCOUNTER (OUTPATIENT)
Dept: RADIATION THERAPY | Facility: HOSPITAL | Age: 58
Discharge: HOME | End: 2018-08-01
Attending: RADIOLOGY
Payer: MEDICARE

## 2018-08-02 ENCOUNTER — HOSPITAL ENCOUNTER (OUTPATIENT)
Dept: RADIOLOGY | Facility: HOSPITAL | Age: 58
Discharge: HOME | End: 2018-08-02
Attending: NEUROLOGICAL SURGERY
Payer: MEDICARE

## 2018-08-02 DIAGNOSIS — Q28.2 AVM (ARTERIOVENOUS MALFORMATION) BRAIN: ICD-10-CM

## 2018-08-02 DIAGNOSIS — Q27.30 AVM (ARTERIOVENOUS MALFORMATION): ICD-10-CM

## 2018-08-02 PROCEDURE — 25500020 PHARM REV CODE 255: Performed by: NEUROLOGICAL SURGERY

## 2018-08-02 PROCEDURE — 70544 MR ANGIOGRAPHY HEAD W/O DYE: CPT | Mod: TC

## 2018-08-02 PROCEDURE — 70552 MRI BRAIN STEM W/DYE: CPT | Mod: TC

## 2018-08-02 PROCEDURE — A9585 GADOBUTROL INJECTION: HCPCS | Performed by: NEUROLOGICAL SURGERY

## 2018-08-02 PROCEDURE — 70552 MRI BRAIN STEM W/DYE: CPT | Mod: 26,,, | Performed by: RADIOLOGY

## 2018-08-02 RX ORDER — GADOBUTROL 604.72 MG/ML
10 INJECTION INTRAVENOUS
Status: COMPLETED | OUTPATIENT
Start: 2018-08-02 | End: 2018-08-02

## 2018-08-02 RX ADMIN — GADOBUTROL 10 ML: 604.72 INJECTION INTRAVENOUS at 02:08

## 2018-08-22 ENCOUNTER — TELEPHONE (OUTPATIENT)
Dept: INTERVENTIONAL RADIOLOGY/VASCULAR | Facility: HOSPITAL | Age: 58
End: 2018-08-22

## 2018-08-23 ENCOUNTER — HOSPITAL ENCOUNTER (OUTPATIENT)
Dept: RADIOLOGY | Facility: HOSPITAL | Age: 58
Discharge: HOME | End: 2018-08-23
Attending: NEUROLOGICAL SURGERY | Admitting: NEUROLOGICAL SURGERY
Payer: MEDICARE

## 2018-08-23 ENCOUNTER — DOCUMENTATION ONLY (OUTPATIENT)
Dept: RADIATION ONCOLOGY | Facility: CLINIC | Age: 58
End: 2018-08-23

## 2018-08-23 ENCOUNTER — HOSPITAL ENCOUNTER (OUTPATIENT)
Facility: HOSPITAL | Age: 58
Discharge: HOME | End: 2018-08-23
Attending: NEUROLOGICAL SURGERY | Admitting: NEUROLOGICAL SURGERY
Payer: MEDICARE

## 2018-08-23 VITALS
SYSTOLIC BLOOD PRESSURE: 122 MMHG | HEART RATE: 78 BPM | TEMPERATURE: 98 F | OXYGEN SATURATION: 95 % | RESPIRATION RATE: 14 BRPM | BODY MASS INDEX: 24.99 KG/M2 | WEIGHT: 150 LBS | DIASTOLIC BLOOD PRESSURE: 61 MMHG | HEIGHT: 65 IN

## 2018-08-23 DIAGNOSIS — Q28.2 AVM (ARTERIOVENOUS MALFORMATION) BRAIN: Primary | ICD-10-CM

## 2018-08-23 DIAGNOSIS — Q27.30 AVM (ARTERIOVENOUS MALFORMATION): ICD-10-CM

## 2018-08-23 DIAGNOSIS — Q28.2 AVM (ARTERIOVENOUS MALFORMATION) BRAIN: ICD-10-CM

## 2018-08-23 LAB
ALBUMIN SERPL BCP-MCNC: 3.5 G/DL
ALP SERPL-CCNC: 91 U/L
ALT SERPL W/O P-5'-P-CCNC: 11 U/L
ANION GAP SERPL CALC-SCNC: 11 MMOL/L
AST SERPL-CCNC: 14 U/L
BASOPHILS # BLD AUTO: 0.04 K/UL
BASOPHILS NFR BLD: 0.5 %
BILIRUB SERPL-MCNC: 0.3 MG/DL
BUN SERPL-MCNC: 19 MG/DL
CALCIUM SERPL-MCNC: 8.9 MG/DL
CHLORIDE SERPL-SCNC: 104 MMOL/L
CO2 SERPL-SCNC: 26 MMOL/L
CREAT SERPL-MCNC: 0.7 MG/DL
DIFFERENTIAL METHOD: ABNORMAL
EOSINOPHIL # BLD AUTO: 0.2 K/UL
EOSINOPHIL NFR BLD: 2.7 %
ERYTHROCYTE [DISTWIDTH] IN BLOOD BY AUTOMATED COUNT: 14.2 %
EST. GFR  (AFRICAN AMERICAN): >60 ML/MIN/1.73 M^2
EST. GFR  (NON AFRICAN AMERICAN): >60 ML/MIN/1.73 M^2
GLUCOSE SERPL-MCNC: 88 MG/DL
HCT VFR BLD AUTO: 45.5 %
HGB BLD-MCNC: 15 G/DL
IMM GRANULOCYTES # BLD AUTO: 0.03 K/UL
IMM GRANULOCYTES NFR BLD AUTO: 0.4 %
INR PPP: 1
LYMPHOCYTES # BLD AUTO: 2.2 K/UL
LYMPHOCYTES NFR BLD: 27.3 %
MCH RBC QN AUTO: 32 PG
MCHC RBC AUTO-ENTMCNC: 33 G/DL
MCV RBC AUTO: 97 FL
MONOCYTES # BLD AUTO: 0.8 K/UL
MONOCYTES NFR BLD: 10.3 %
NEUTROPHILS # BLD AUTO: 4.6 K/UL
NEUTROPHILS NFR BLD: 58.8 %
NRBC BLD-RTO: 0 /100 WBC
PLATELET # BLD AUTO: 249 K/UL
PMV BLD AUTO: 10.2 FL
POTASSIUM SERPL-SCNC: 4.6 MMOL/L
PROT SERPL-MCNC: 6.7 G/DL
PROTHROMBIN TIME: 10.6 SEC
RBC # BLD AUTO: 4.69 M/UL
SODIUM SERPL-SCNC: 141 MMOL/L
WBC # BLD AUTO: 7.88 K/UL

## 2018-08-23 PROCEDURE — 77370 RADIATION PHYSICS CONSULT: CPT | Performed by: RADIOLOGY

## 2018-08-23 PROCEDURE — 70496 CT ANGIOGRAPHY HEAD: CPT | Mod: 26,,, | Performed by: RADIOLOGY

## 2018-08-23 PROCEDURE — 77014 HC CT GUIDANCE RADIATION THERAPY FLDS PLACEMENT: CPT | Mod: TC | Performed by: RADIOLOGY

## 2018-08-23 PROCEDURE — 85025 COMPLETE CBC W/AUTO DIFF WBC: CPT

## 2018-08-23 PROCEDURE — 77338 DESIGN MLC DEVICE FOR IMRT: CPT | Mod: TC | Performed by: RADIOLOGY

## 2018-08-23 PROCEDURE — 25500020 PHARM REV CODE 255: Performed by: NEUROLOGICAL SURGERY

## 2018-08-23 PROCEDURE — 77372 SRS LINEAR BASED: CPT | Performed by: RADIOLOGY

## 2018-08-23 PROCEDURE — 77300 RADIATION THERAPY DOSE PLAN: CPT | Mod: TC | Performed by: RADIOLOGY

## 2018-08-23 PROCEDURE — 77338 DESIGN MLC DEVICE FOR IMRT: CPT | Mod: 26,,, | Performed by: RADIOLOGY

## 2018-08-23 PROCEDURE — 77301 RADIOTHERAPY DOSE PLAN IMRT: CPT | Mod: 26,,, | Performed by: RADIOLOGY

## 2018-08-23 PROCEDURE — 77300 RADIATION THERAPY DOSE PLAN: CPT | Mod: 26,,, | Performed by: RADIOLOGY

## 2018-08-23 PROCEDURE — 85610 PROTHROMBIN TIME: CPT

## 2018-08-23 PROCEDURE — 77470 SPECIAL RADIATION TREATMENT: CPT | Mod: 59,TC | Performed by: RADIOLOGY

## 2018-08-23 PROCEDURE — 70496 CT ANGIOGRAPHY HEAD: CPT | Mod: TC

## 2018-08-23 PROCEDURE — 77301 RADIOTHERAPY DOSE PLAN IMRT: CPT | Mod: TC | Performed by: RADIOLOGY

## 2018-08-23 PROCEDURE — 25000003 PHARM REV CODE 250: Performed by: PHYSICIAN ASSISTANT

## 2018-08-23 PROCEDURE — 63600175 PHARM REV CODE 636 W HCPCS: Performed by: NEUROLOGICAL SURGERY

## 2018-08-23 PROCEDURE — 80053 COMPREHEN METABOLIC PANEL: CPT

## 2018-08-23 PROCEDURE — 25000003 PHARM REV CODE 250: Performed by: FAMILY MEDICINE

## 2018-08-23 RX ORDER — MIDAZOLAM HYDROCHLORIDE 1 MG/ML
INJECTION INTRAMUSCULAR; INTRAVENOUS CODE/TRAUMA/SEDATION MEDICATION
Status: COMPLETED | OUTPATIENT
Start: 2018-08-23 | End: 2018-08-23

## 2018-08-23 RX ORDER — FENTANYL CITRATE 50 UG/ML
INJECTION, SOLUTION INTRAMUSCULAR; INTRAVENOUS CODE/TRAUMA/SEDATION MEDICATION
Status: COMPLETED | OUTPATIENT
Start: 2018-08-23 | End: 2018-08-23

## 2018-08-23 RX ORDER — HYDROCODONE BITARTRATE AND ACETAMINOPHEN 10; 325 MG/1; MG/1
1 TABLET ORAL EVERY 8 HOURS PRN
COMMUNITY

## 2018-08-23 RX ORDER — BUPIVACAINE HYDROCHLORIDE 5 MG/ML
30 INJECTION, SOLUTION EPIDURAL; INTRACAUDAL ONCE
Status: DISCONTINUED | OUTPATIENT
Start: 2018-08-23 | End: 2018-08-23 | Stop reason: HOSPADM

## 2018-08-23 RX ORDER — HEPARIN SODIUM 1000 [USP'U]/ML
3000 INJECTION, SOLUTION INTRAVENOUS; SUBCUTANEOUS ONCE
Status: DISCONTINUED | OUTPATIENT
Start: 2018-08-23 | End: 2018-08-23 | Stop reason: HOSPADM

## 2018-08-23 RX ORDER — SODIUM CHLORIDE 9 MG/ML
INJECTION, SOLUTION INTRAVENOUS CONTINUOUS
Status: DISCONTINUED | OUTPATIENT
Start: 2018-08-23 | End: 2018-08-23 | Stop reason: HOSPADM

## 2018-08-23 RX ORDER — FENTANYL CITRATE 50 UG/ML
50 INJECTION, SOLUTION INTRAMUSCULAR; INTRAVENOUS
Status: DISCONTINUED | OUTPATIENT
Start: 2018-08-23 | End: 2018-08-23 | Stop reason: HOSPADM

## 2018-08-23 RX ORDER — IODIXANOL 320 MG/ML
250 INJECTION, SOLUTION INTRAVASCULAR
Status: COMPLETED | OUTPATIENT
Start: 2018-08-23 | End: 2018-08-23

## 2018-08-23 RX ORDER — FENTANYL CITRATE 50 UG/ML
25 INJECTION, SOLUTION INTRAMUSCULAR; INTRAVENOUS ONCE
Status: COMPLETED | OUTPATIENT
Start: 2018-08-23 | End: 2018-08-23

## 2018-08-23 RX ORDER — MIDAZOLAM HYDROCHLORIDE 1 MG/ML
1 INJECTION INTRAMUSCULAR; INTRAVENOUS
Status: DISCONTINUED | OUTPATIENT
Start: 2018-08-23 | End: 2018-08-23 | Stop reason: HOSPADM

## 2018-08-23 RX ORDER — LIDOCAINE HYDROCHLORIDE AND EPINEPHRINE 10; 10 MG/ML; UG/ML
30 INJECTION, SOLUTION INFILTRATION; PERINEURAL ONCE
Status: DISCONTINUED | OUTPATIENT
Start: 2018-08-23 | End: 2018-08-23 | Stop reason: HOSPADM

## 2018-08-23 RX ORDER — HYDROCODONE BITARTRATE AND ACETAMINOPHEN 5; 325 MG/1; MG/1
1 TABLET ORAL EVERY 4 HOURS PRN
Status: DISCONTINUED | OUTPATIENT
Start: 2018-08-23 | End: 2018-08-23 | Stop reason: HOSPADM

## 2018-08-23 RX ADMIN — IOHEXOL 75 ML: 350 INJECTION, SOLUTION INTRAVENOUS at 09:08

## 2018-08-23 RX ADMIN — SODIUM CHLORIDE: 0.9 INJECTION, SOLUTION INTRAVENOUS at 06:08

## 2018-08-23 RX ADMIN — FENTANYL CITRATE 25 MCG: 50 INJECTION, SOLUTION INTRAMUSCULAR; INTRAVENOUS at 07:08

## 2018-08-23 RX ADMIN — MIDAZOLAM HYDROCHLORIDE 0.5 MG: 1 INJECTION, SOLUTION INTRAMUSCULAR; INTRAVENOUS at 07:08

## 2018-08-23 RX ADMIN — MIDAZOLAM HYDROCHLORIDE 2 MG: 1 INJECTION, SOLUTION INTRAMUSCULAR; INTRAVENOUS at 07:08

## 2018-08-23 RX ADMIN — HYDROCODONE BITARTRATE AND ACETAMINOPHEN 1 TABLET: 5; 325 TABLET ORAL at 01:08

## 2018-08-23 RX ADMIN — MIDAZOLAM HYDROCHLORIDE 0.5 MG: 1 INJECTION, SOLUTION INTRAMUSCULAR; INTRAVENOUS at 08:08

## 2018-08-23 RX ADMIN — FENTANYL CITRATE 25 MCG: 50 INJECTION INTRAMUSCULAR; INTRAVENOUS at 11:08

## 2018-08-23 RX ADMIN — FENTANYL CITRATE 25 MCG: 50 INJECTION, SOLUTION INTRAMUSCULAR; INTRAVENOUS at 08:08

## 2018-08-23 RX ADMIN — IODIXANOL 150 ML: 320 INJECTION, SOLUTION INTRAVASCULAR at 09:08

## 2018-08-23 NOTE — PROGRESS NOTES
Pt arrived to ROCU bed 2 for 2 hour post Cerebral angio recovery. Report received from ISAAC Kern. Pt denies pain/discomfort. Dressing CDI. VSS. No acute events. See flow sheets for post procedure monitoring.

## 2018-08-23 NOTE — PROCEDURES
Radiology Post-Procedure Note    Pre Op Diagnosis: cerebellar AVM    Post Op Diagnosis: same    Procedure: Cerebral angiogram for radiosurgery planning    Procedure performed by: Rico Garcia MD    Procedure report: Cerebellar AVM    Preliminary interpretation:  Please see Imaging report for full details.    A right femoral artery angiogram was performed, the sheath removed and hemostasis achieved using 5F exo seal.  No hematoma was present at the time of hemostasis.    The patient tolerated the procedure well.     Rico Marion M.D.  [facility].  [phone].

## 2018-08-23 NOTE — PROGRESS NOTES
Cerebral angiogram complete. Hemostasis achieved via right groin with use of 5 FR. Exoseal closure device at 0852 by MD Radha. HOB to remain flat and right leg straight until 1052. No acute events. See flowsheet for further monitoring.

## 2018-08-23 NOTE — PROGRESS NOTES
Notified ARINA Patino that patient is complaining of 7-8/10 pain at Halo insertion sites, patient received Fentanyl 15mcg IVP, and no other pain medication ordered for patient. ARINA Patino stated that he would place order for [place].

## 2018-08-23 NOTE — PLAN OF CARE
Patient and patient's partner received discharge instructions.  Patient and patient's partner verbalized understanding of all instructions given and all questions were addressed prior to patient's discharge.  Patient's vital signs are stable and within patient's baseline.  Patient tolerated clear liquids PO.  Patient voided without difficulty in post-op.  Patient states pain is 3/10 and tolerable.  Patient denies nausea and vomiting at this time.  Patient meets all criteria for discharge at this time.  All required consents present in patient's chart upon patient's discharge.

## 2018-08-23 NOTE — H&P
[facility]  Neurosurgery  History & Physical    Patient Name: Ida Garduno  MRN: [id number]  Admission Date: 8/23/2018  Attending Physician: Rico Garcia M.D.   Primary Care Provider: Timoteo Negrete MD  .     Subjective:     Chief Complaint/Reason for Admission:     History of Present Illness:   Ida Garduno is a 57 y.o. patient with history of hypertension, smoking, chronic back pain, chronic neck pain.   Spetzler Oracio grade 3 cerebellar AVM found on angiogram on 7/6/2018.  Here today for repeat angiogram with Dr. Garcia.             PTA Medications   Medication Sig    HYDROcodone-acetaminophen (NORCO)  mg per tablet Take 1 tablet by mouth every 8 (eight) hours as needed for Pain.       Review of patient's allergies indicates:   Allergen Reactions    Codeine      Other reaction(s): Unknown    Pheniramine        Past Medical History:   Diagnosis Date    AVM (arteriovenous malformation)      Past Surgical History:   Procedure Laterality Date    APPENDECTOMY      13yrs     HIP ARTHROSCOPY W/ LABRAL REPAIR Right 2016    HYSTERECTOMY  2007    KIDNEY STONE SURGERY  1980    SPINE SURGERY  1999     Family History     None        Tobacco Use    Smoking status: Current Some Day Smoker     Packs/day: 1.00     Types: Cigarettes     Start date: 6/18/1982    Smokeless tobacco: Never Used   Substance and Sexual Activity    Alcohol use: Yes     Alcohol/week: 3.0 oz     Types: 5 Shots of liquor per week     Comment: social     Drug use: No    Sexual activity: Yes     Partners: Male     Review of Systems   Constitutional: Negative for activity change.   HENT: Negative for congestion.    Eyes: Negative for discharge.   Respiratory: Negative for apnea.    Cardiovascular: Negative for chest pain.   Gastrointestinal: Negative for abdominal distention and constipation.   Endocrine: Negative for cold intolerance.   Genitourinary: Negative for difficulty urinating and enuresis.    Musculoskeletal: Positive for back pain and neck pain.   Neurological: Positive for headaches. Negative for dizziness and weakness.       Objective:     Weight: 68 kg (150 lb)  Body mass index is 24.96 kg/m².  Vital Signs (Most Recent):  Temp: 98.3 °F (36.8 °C) (08/23/18 0626)  Pulse: 71 (08/23/18 0750)  Resp: (!) 22 (08/23/18 0750)  BP: (!) 141/67 (08/23/18 0750)  SpO2: 97 % (08/23/18 0750) Vital Signs (24h Range):  Temp:  [98.3 °F (36.8 °C)] 98.3 °F (36.8 °C)  Pulse:  [71-97] 71  Resp:  [11-22] 22  SpO2:  [94 %-99 %] 97 %  BP: (128-150)/(65-91) 141/67                           Neurosurgery Physical Exam     Constitutional: She appears well-developed.      Eyes: Pupils are equal, round, and reactive to light. EOM are normal. Right eye exhibits no discharge. Left eye exhibits no discharge.      Abdominal: Soft.      Skin: Skin displays no rash on trunk and no rash on extremities.     Psych/Behavior: She is alert. She is oriented to person, place, and time.     Musculoskeletal:        Neck: There is no tenderness.        Back: Range of motion is full.        Right Upper Extremities: Range of motion is full. Muscle strength is 5/5. Tone is normal.        Left Upper Extremities: Range of motion is full. Muscle strength is 5/5. Tone is normal.       Right Lower Extremities: Range of motion is full. Muscle strength is 5/5. Tone is normal.        Left Lower Extremities: Range of motion is full. Muscle strength is 5/5. Tone is normal.     Neurological:        Coordination: She has an abnormal Romberg Test. She has normal finger to nose coordination and normal tandem walking coordination.        Sensory: There is no sensory deficit in the trunk. There is no sensory deficit in the extremities.        DTRs: DTRs are DTRS NORMAL AND SYMMETRICnormal and symmetric. Tricep reflexes are 2+ on the right side and 2+ on the left side. Bicep reflexes are 2+ on the right side and 2+ on the left side. Brachioradialis reflexes are  2+ on the right side and 2+ on the left side. Patellar reflexes are 2+ on the right side and 2+ on the left side. Achilles reflexes are 2+ on the right side and 2+ on the left side. She displays no Babinski's sign on the right side. She displays no Babinski's sign on the left side.        Cranial nerves: Cranial nerve(s) II, III, IV, V, VI, VII, VIII, IX, X, XI and XII are intact.    Positive Myers's on the left side.   Positive Romberg               Significant Labs:  Recent Labs   Lab  08/23/18 0612   GLU  88   NA  141   K  4.6   CL  104   CO2  26   BUN  19   CREATININE  0.7   CALCIUM  8.9     Recent Labs   Lab  08/23/18 0612   WBC  7.88   HGB  15.0   HCT  45.5   PLT  249     Recent Labs   Lab  08/23/18 0612   INR  1.0     Microbiology Results (last 7 days)     ** No results found for the last 168 hours. **            Assessment/Plan:     Active Diagnoses:    Diagnosis Date Noted POA    AVM (arteriovenous malformation) brain [Q28.2] 08/23/2018 Not Applicable      Problems Resolved During this Admission:      58 yo female with Spetzler Oracio grade 3 cerebellar AVM     Mallampati 2  ASA 2    - Pt is neurologically stable  - Angiogram today  - Discussed with ARINA Sahni  Neurosurgery  [facility]

## 2018-08-23 NOTE — DISCHARGE INSTRUCTIONS

## 2018-08-24 NOTE — NURSING
8/23/2018 @ 1601 KWAKU Hydromorphone 2mg PO X1 now per Dr. Garcia.   1603 Hydromorphone 2mg given by mouth per order by Dr. Garcia for pain of 10 on 0-10 scale.

## 2018-08-28 DIAGNOSIS — R11.0 NAUSEA: Primary | ICD-10-CM

## 2018-08-28 RX ORDER — ONDANSETRON HYDROCHLORIDE 8 MG/1
8 TABLET, FILM COATED ORAL EVERY 8 HOURS PRN
Qty: 6 TABLET | Refills: 1 | Status: SHIPPED | OUTPATIENT
Start: 2018-08-28 | End: 2018-11-05 | Stop reason: DRUGHIGH

## 2018-08-28 NOTE — TELEPHONE ENCOUNTER
Pt reports that the nausea is not constant but brutal when it does occur. She has the phenergan with no relief of symptoms. The pt is able to hold fluids at times but nothing solid.  ,  It is noted  3.4 by 1.9 by 3.5 c cm  left cerebellar arterial venous malformation with main venous drainage to the straight sinus but with secondary drain is to the transverse sinuses, left greater than right

## 2018-08-28 NOTE — TELEPHONE ENCOUNTER
----- Message from Yaolester Machado sent at 8/28/2018 11:27 AM CDT -----  Contact: Patient @ [phone]  Patient is requesting a return call about numbness in the top of her head ( she began experiencing a week ago ) and really bad  Nausea, pls call to advise

## 2018-09-01 NOTE — DISCHARGE SUMMARY
[facility]  Neurosurgery  Discharge Summary      Patient Name: Ida Garduno  MRN: [id number]  Admission Date: 8/23/2018  Hospital Length of Stay: 0 days  Discharge Date: 8/23/18  Attending Physician: Rico Garcia M.D.   Discharging Provider: ARINA Shen  Primary Care Provider: Timoteo Negrete MD    HPI:   Ida Garduno is a 57 y.o. patient with history of hypertension, smoking, chronic back pain, chronic neck pain.   Spetzler Oracio grade 3 cerebellar AVM found on angiogram on 7/6/2018.  Here today for repeat angiogram with Dr. Garcia.     Procedure(s) (LRB):  Angiogram-Cerebral (N/A)     Hospital Course: Pt did well and went to the ROCU post procedure.  She was kept flat for 2 hours post op.  Pt without groin hematoma.  Good peripheral pulse. Full strength in LEs.  Denies pain, or N/T in LEs.  At the time of DC her VSS, she was afebrile, and neurologically stable.  She was counseled on wound care, activity restrictions, and follow up prior to discharge.           Pending Diagnostic Studies:     None        Final Active Diagnoses:    Diagnosis Date Noted POA    AVM (arteriovenous malformation) brain [Q28.2] 08/23/2018 Not Applicable      Problems Resolved During this Admission:      Discharged Condition: good    Disposition: Home or Self Care    Patient Instructions:      Notify your health care provider if you experience any of the following:  temperature >100.4     Notify your health care provider if you experience any of the following:  persistent nausea and vomiting or diarrhea     Notify your health care provider if you experience any of the following:  severe uncontrolled pain     Notify your health care provider if you experience any of the following:  redness, tenderness, or signs of infection (pain, swelling, redness, odor or green/yellow discharge around incision site)     Notify your health care provider if you experience any of the following:  difficulty  breathing or increased cough     Notify your health care provider if you experience any of the following:  severe persistent headache     Notify your health care provider if you experience any of the following:  worsening rash     Notify your health care provider if you experience any of the following:  persistent dizziness, light-headedness, or visual disturbances     Notify your health care provider if you experience any of the following:  increased confusion or weakness     Medications:  Reconciled Home Medications:      Medication List      CONTINUE taking these medications    HYDROcodone-acetaminophen  mg per tablet  Commonly known as:  NORCO  Take 1 tablet by mouth every 8 (eight) hours as needed for Pain.            ARINA Shen  Neurosurgery  [facility]

## 2018-09-06 ENCOUNTER — CLINICAL SUPPORT (OUTPATIENT)
Dept: NEUROSURGERY | Facility: CLINIC | Age: 58
End: 2018-09-06
Payer: MEDICARE

## 2018-09-06 DIAGNOSIS — Q28.2 CEREBRAL ARTERIOVENOUS MALFORMATION (AVM): Primary | ICD-10-CM

## 2018-09-06 NOTE — PROGRESS NOTES
Wound Check   Neurosurgery      Ms. Ida Nieves is a pleasant 57 y.o. female s/p right angiogram and LINAC SRS treatment for AVM with Dr. Garcia on 8/23/18. She presents to clinic today for her 2 week wound check. Pt has a steady gait, normal affect, is in NAD. Denies fevers, chills, night sweats, back pain. Has had N/V almost daily since procedure. Dr Garcia Rxd Zofran. Helps some. Last time she vomited was yesterday.      Right groin is clean, dry and intact with no signs of erythema, ecchymosis, swelling or purulent drainage. All skin edges are completely approximated. Angio site is soft to the touch. Pt denies discomfort at incision. Femoral and pedal pulses are palpable.    Follow up appt with Dr Garcia given to patient. All questions answered.     Encouraged patient to call if they have any questions or concerns.         Norma Machado RN  Neurosurgery  [phone]

## 2018-09-21 NOTE — OP NOTE
[facility]  Stereotactic Radiosurgery Treatment Summary  Operative Note     SUMMARY      Name of Patient: Ida Garduno     Date of Procedure: 8/23/18     Neurosurgeon: Rico Marion MD     Radiation Oncologist: Dr. Parekh     Procedure: Stereotactic Radiosurgery     Pre-Operative Diagnosis:   Cerebellar AVM     Post-Operative Diagnosis: Same.     Operative Procedure:  1.  Placement of stereotactic frame.      2.  Planning of complex LINAC radiosurgery.      3.  Delivery of complex LINAC radiosurgery.      4.  Removal of frame.      Indications in detail:   Mrs Garduno is a 58 year old patient with history of headaches and a high flow cerebellar Arteriovenous malformation. Patient had been evaluated with a cerebral angiogram previously. Risk and benefits of the procedure were discussed with the patient and family in detail. Patient consented for the procedure. Patient came for elective radiosurgery treatment.         Procedure in detail:   After obtaining informed consents, explaining risks and benefits of the procedure, patient was consented for the procedure.      Under conscious sedation, the radiosurgery LINAC frame was placed over the scalp. We used 20 cc of 1% lidocaine/marcaine with epinephrine on the pin sites. Pins were secured in place using screw drivers to 21 Krishna's.      After frame placement, patient went for cerebral angiogram and CTA of the head.      Patient's planning was performed using cerebral angiography / CTA / MRA and fused with treatment planning CT on [employer] system and dose calculations completed on Varian Eclipse Treatment Planning System.     Several plans for radiosurgery were tailored to assure best Nidus coverage with minimal doses to adjacent normal brain structures. Radiation dose was 21 Gy @ 88% isodose line. AVM treatment planning volume was 8.0643 c.c..     After careful planning, patient was brought to the LINAC suite and radiosurgery  treatment was planned to be provided over the cerebellar.Treatment positioning was verfied using stereotactic coordinates established from the treatment plan and BrainLab target positioning sytem. Secondary verification was performed using cone beam CT scan on the linear accelerator prior to treament and each couch position were verified using the Brainlab target positioner and adjustments were made, as necessary, with the BrainLab couch mount adapter micropositioners.     AVM was treated with 14 modulated arcs using RapidArc delivery method on [employer] Solution TrueBeam STx linear accelerator with 6 MV photons.     Patient tolerated the procedure well. Patient was removed from the LINAC table and his frame was removed. The pin sites were cleaned with betadine and sterile gauze. The pin sites were covered with bacitracin ointment. The head was wrapped with gauze.      Dr. Garcia was present during the entire procedure.      Rico Marion MD

## 2018-11-05 ENCOUNTER — OFFICE VISIT (OUTPATIENT)
Dept: NEUROSURGERY | Facility: CLINIC | Age: 58
End: 2018-11-05
Payer: MEDICARE

## 2018-11-05 VITALS
TEMPERATURE: 98 F | DIASTOLIC BLOOD PRESSURE: 60 MMHG | BODY MASS INDEX: 24.76 KG/M2 | WEIGHT: 148.81 LBS | SYSTOLIC BLOOD PRESSURE: 129 MMHG | HEART RATE: 86 BPM

## 2018-11-05 DIAGNOSIS — G95.20 CERVICAL CORD COMPRESSION WITH MYELOPATHY: Primary | ICD-10-CM

## 2018-11-05 DIAGNOSIS — G44.229 CHRONIC TENSION-TYPE HEADACHE, NOT INTRACTABLE: ICD-10-CM

## 2018-11-05 DIAGNOSIS — Q28.2 CEREBRAL ARTERIOVENOUS MALFORMATION (AVM): ICD-10-CM

## 2018-11-05 PROCEDURE — 99213 OFFICE O/P EST LOW 20 MIN: CPT | Mod: PBBFAC | Performed by: NEUROLOGICAL SURGERY

## 2018-11-05 PROCEDURE — 99213 OFFICE O/P EST LOW 20 MIN: CPT | Mod: 24,S$PBB,, | Performed by: NEUROLOGICAL SURGERY

## 2018-11-05 PROCEDURE — 99999 PR PBB SHADOW E&M-EST. PATIENT-LVL III: CPT | Mod: PBBFAC,,, | Performed by: NEUROLOGICAL SURGERY

## 2018-11-05 RX ORDER — OXYCODONE AND ACETAMINOPHEN 7.5; 325 MG/1; MG/1
1 TABLET ORAL EVERY 6 HOURS PRN
COMMUNITY
Start: 2018-10-19 | End: 2021-07-22

## 2018-11-05 RX ORDER — ONDANSETRON 4 MG/1
TABLET, ORALLY DISINTEGRATING ORAL
COMMUNITY
Start: 2018-09-20

## 2018-11-05 NOTE — PROGRESS NOTES
History & Physical    I, Zoran Rodriguez, attest that this documentation has been prepared under the direction and in the presence of Rico Marion MD.      11/05/2018    Chief Complaint   Patient presents with    Follow-up       History of Present Illness:  Ida Garduno is a 58 y.o. patient with history of cerebellar AVM s/p right angiogram and LINAC SRS treatment for AVM, chronic neck pain and cervical myelopathy, chronic back pain. Patient presents for follow up evaluation.     Regarding her balance difficulty, patient feels overall 75% improved after radiosurgery. Patient continues to complain of severe occipital headaches. The headaches come on whenever she has neck pain. Patient states that her headaches were completely resolved temporarily after having halo and local anesthesia over the occiput. Patient reports that the pain radiates down to her left shoulder. Her pain is 5/10 in intensity and is constant. Patient feels that her left hand still has decreased function compared to her right hand.     Patient is also complaining of nausea and vomiting. Her nausea and vomiting is not related to PO intake.  has noticed that patient has lost about 15 lbs in the last 4 months.     Interval History, dated 6/18/2018:   Ida Garduno is a 57 y.o. patient with history of hypertension, smoking, chronic back pain, chronic neck pain. Patient was referred to me by Dr. Timoteo Negrete MD for evaluation of her cervical spine and lumbar spine. Patient reports her symptoms as headache, neck pain, back pain.      Patient reports constant, daily headache located to her occipital area. Patient denies any positional component or exertional component. She states that her headache has been present for 4 months, with immediate onset after she went for lumbar epidural steroid injections for back pain. Patient denies any valsalva headaches.         Patient also complains of neck pain, described as sharp  shooting pain that radiates down her left shoulder and down her left scapular area. Patient denies any hand numbness. No alleviating factors are identifiable by the patient. Exacerbating factors include activities such as mopping and sweeping, which worsen both her neck pain and back pain. Past treatments include physical therapy. Patient has taken Narcotics in the past, but is no longer being prescribed narcotics. Associated signs and symptoms are positive for dropping objects from her left hand only for the last few years, positive for gait instability. Patient denies any falling within the last 6 months but endorses multiple near falls. She denies noticing any sloppier handwriting (patient is right hand dominant). Patient denies any bowel / bladder incontinence.      Patient reports history of previous back surgery in 1999. Patient claims that this was a L4-5 fusion. No history of previous neck surgery. Patient reports history of smoking (less than 1 ppd).         Review of patient's allergies indicates:   Allergen Reactions    Codeine      Other reaction(s): Unknown    Pheniramine        Current Outpatient Medications   Medication Sig Dispense Refill    HYDROcodone-acetaminophen (NORCO)  mg per tablet Take 1 tablet by mouth every 8 (eight) hours as needed for Pain.      ondansetron (ZOFRAN-ODT) 4 MG TbDL       oxyCODONE-acetaminophen (PERCOCET) 7.5-325 mg per tablet        No current facility-administered medications for this visit.        Past Medical History:   Diagnosis Date    AVM (arteriovenous malformation)        Past Surgical History:   Procedure Laterality Date    Angiogram-Cerebral N/A 8/23/2018    Performed by [facility] at [facility] OR [address] [place] [address]    APPENDECTOMY      13yrs     CEREBRAL ANGIOGRAM N/A 8/23/2018    Procedure: Angiogram-Cerebral;  Surgeon: [facility];  Location: [facility] OR [address] [place] [address];  Service: General;  Laterality: N/A;    HIP ARTHROSCOPY W/ LABRAL REPAIR  Right 2016    HYSTERECTOMY  2007    KIDNEY STONE SURGERY  1980    SPINE SURGERY  1999       History reviewed. No pertinent family history.    Social History     Tobacco Use    Smoking status: Current Some Day Smoker     Packs/day: 1.00     Types: Cigarettes     Start date: 6/18/1982    Smokeless tobacco: Current User   Substance Use Topics    Alcohol use: Yes     Alcohol/week: 3.0 oz     Types: 5 Shots of liquor per week     Comment: social     Drug use: No        Review of Systems:  Review of Systems   Constitutional: Negative for activity change.   HENT: Negative for congestion.    Eyes: Negative for discharge.   Respiratory: Negative for apnea.    Cardiovascular: Negative for chest pain.   Gastrointestinal: Negative for abdominal distention.   Endocrine: Negative for cold intolerance.   Genitourinary: Negative for difficulty urinating.   Musculoskeletal: Positive for neck pain. Negative for arthralgias.   Neurological: Positive for headaches. Negative for dizziness and weakness.   Psychiatric/Behavioral: Negative for agitation.       Vital Signs (Most Recent)  Temp: 98.1 °F (36.7 °C) (11/05/18 1352)  Pulse: 86 (11/05/18 1352)  BP: 129/60 (11/05/18 1352)     67.5 kg (148 lb 12.8 oz)       Physical Exam:  Physical Exam:    Constitutional: She appears well-developed.     Eyes: Pupils are equal, round, and reactive to light. EOM are normal. Right eye exhibits no discharge. Left eye exhibits no discharge.     Abdominal: Soft.     Skin: Skin displays no rash on trunk and no rash on extremities.     Psych/Behavior: She is alert. She is oriented to person, place, and time.     Musculoskeletal:        Neck: There is no tenderness.        Back: Range of motion is full.        Right Upper Extremities: Range of motion is full. Muscle strength is 5/5. Tone is normal.        Left Upper Extremities: Range of motion is full. Muscle strength is 5/5. Tone is normal.       Right Lower Extremities: Range of motion is full.  Muscle strength is 5/5. Tone is normal.        Left Lower Extremities: Range of motion is full. Muscle strength is 5/5. Tone is normal.     Neurological:        Coordination: She has a normal Romberg Test and normal tandem walking coordination.        Sensory: There is no sensory deficit in the trunk. There is no sensory deficit in the extremities.        DTRs: DTRs are DTRS NORMAL AND SYMMETRICnormal and symmetric. Tricep reflexes are 2+ on the right side and 2+ on the left side. Bicep reflexes are 2+ on the right side and 2+ on the left side. Brachioradialis reflexes are 2+ on the right side and 2+ on the left side. Patellar reflexes are 2+ on the right side and 2+ on the left side. Achilles reflexes are 2+ on the right side and 2+ on the left side. She displays no Babinski's sign on the right side. She displays no Babinski's sign on the left side.        Cranial nerves: Cranial nerve(s) II, III, IV, V, VI, VII, VIII, IX, X, XI and XII are intact.       Negative- Myers's on the right, positive Myers's on the left.   abnormal tandem gait   Negative clonus.   Positive dysmetria on the left side.     Frame pin sites are well healed.   Small areas of alopecia over the top and back of her head, likely related to radiosurgery.     Laboratory  CBC: Reviewed  CMP: Reviewed    Diagnostic Results:  CT: Reviewed  MRI: Reviewed   I have personally reviewed the images and discussed them with the patient:      CTA Head, dated 8/23/2018: Reviewed.   Volumetric CT angiogram performed for purposes of radiotherapy localization.    Stable appearance of the known left cerebellar arteriovenous malformation    IR Angiogram Carotid Cerebral Bilateral inc Arch, dated 8/23/2018: Reviewed.   POSTOPERATIVE DIAGNOSIS(ES):  Successful angiographic planning for stereotactic radiosurgery.    MRA Brain, dated 8/02/2018: Reviewed  Stable appearance of the known left cerebellar AVM.  The arterial blood flow but likely supplied through  prominent bilateral superior cerebellar, anterior inferior, and posterior inferior cerebellar arteries.  Arterial supply better delineated on recent DSA.  No arterial aneurysmal outpouching or definite intranidal aneurysm.  Arterialized flow signal seen throughout the deep venous system and straight sinus.    The vertebral arteries appear within normal limits.  The basilar artery is normal.  The ACAs and MCAs appear within normal limits.  Prominent posterior communicating arteries bilaterally.  No high-grade stenosis, major branch occlusion, or intracranial aneurysm identified.    MRI Brain Stealth without Fiducials, dated 8/02/2018: Reviewed.   Stealth protocol MR performed for purposes of procedure localization.    No appreciable change in the known left cerebellar arteriovenous malformation on these limited pulse sequences.    MRI Lumbar Spine Without Contrast, dated 7/05/2018: Reviewed   L4-5 interbody disc spacer.    Lumbar spondylosis, resulting in moderate neural foraminal stenosis at L3-4 and L5-S1, as above.  Mild spinal canal stenosis with grade 1 retrolisthesis at L3-4 noted.    Advanced degenerative disc disease at L3-4, as above.      ASSESSMENT/PLAN:       ICD-10-CM ICD-9-CM   1. Cervical cord compression with myelopathy G95.20 336.9   2. Cerebral arteriovenous malformation (AVM) Q28.2 747.81       PLAN:    1. S/p radiosurgery for a grade 3 cerebellar AVM on 08/23/18. Patient has recovered well. At this point, we will get an MRI W WO contrast of the brain in February 2019, 6 months post radiosurgery rx.     2. Nausea and vomiting.  I do not think that her emesis is neurogenic, since it is not projectile in nature and associated with nausea. I am concerned that she also reports unintentional weight loss. We will refer to PCP to further investigate it.     3. Cervical stenosis with early myelopathy. Continue conservative management. We will get MRI of the C-spine without contrast on February 2019 and  evaluate for worsening symptoms of myelopathy. I have explained the natural history of spondylotic myelopathy, the stepwise decline in neurological function over time, and the role of surgery to prevent further neurological decline but not to improve the already lost neurological functions.  Patient understands this natural history, and is aware of signs and symptoms of progression, including worsening gait and balance, loss of bb function, weakness, loss of fine motor skills.       She wants to transition her spine care to my office. I will evaluate for worsening myelopathy in February. I explained that she may need surgical decompression if she worsens clinically.    4. Occipital headaches, better after placement of the halo with local anesthesia for about 2 weeks. We will refer her to Neurology for management.     5. RTC February 2019 after MRIs.     [place] was seen today for follow-up.    Diagnoses and all orders for this visit:    Cervical cord compression with myelopathy    Cerebral arteriovenous malformation (AVM)        I, Dr. Rico Marion, personally performed the services described in this documentation. All medical record entries made by the scribe were at my direction and in my presence.  I have reviewed the chart and agree that the record reflects my personal performance and is accurate and complete. Rico Marion MD.  10:07 PM 11/05/2018

## 2018-11-05 NOTE — LETTER
November 5, 2018      Timoteo Negrete MD  [address] [place] [address]           [facility] - Neurosurgery 7th Fl  [address]  [place] [address]  Phone: [phone]          Patient: Ida Garduno   MR Number: [id number]   YOB: 1960   Date of Visit: 11/5/2018       Dear Dr. Timoteo Negrete:    Thank you for referring Ida Garduno to me for evaluation. Attached you will find relevant portions of my assessment and plan of care.    If you have questions, please do not hesitate to call me. I look forward to following Ida Garduno along with you.    Sincerely,    Rico Marion MD    Enclosure  CC:  No Recipients    If you would like to receive this communication electronically, please contact [email or web address] or [phone] to request more information on [employer] access.    For providers and/or their staff who would like to refer a patient to [facility], please contact us through our one-stop-shop provider referral line, [facility] , at [phone].    If you feel you have received this communication in error or would no longer like to receive these types of communications, please e-mail [email or web address]

## 2019-02-11 ENCOUNTER — TELEPHONE (OUTPATIENT)
Dept: NEUROSURGERY | Facility: CLINIC | Age: 59
End: 2019-02-11

## 2019-02-11 DIAGNOSIS — Q28.2 CEREBRAL ARTERIOVENOUS MALFORMATION: ICD-10-CM

## 2019-02-11 DIAGNOSIS — G95.89 MYELOMALACIA OF CERVICAL CORD: ICD-10-CM

## 2019-02-11 DIAGNOSIS — G95.20 CERVICAL CORD COMPRESSION WITH MYELOPATHY: Primary | ICD-10-CM

## 2019-02-28 ENCOUNTER — HOSPITAL ENCOUNTER (OUTPATIENT)
Dept: RADIOLOGY | Facility: HOSPITAL | Age: 59
Discharge: HOME | End: 2019-02-28
Attending: NEUROLOGICAL SURGERY
Payer: MEDICARE

## 2019-02-28 ENCOUNTER — OFFICE VISIT (OUTPATIENT)
Dept: NEUROSURGERY | Facility: CLINIC | Age: 59
End: 2019-02-28
Payer: MEDICARE

## 2019-02-28 VITALS — BODY MASS INDEX: 25.78 KG/M2 | HEIGHT: 65 IN | TEMPERATURE: 98 F | WEIGHT: 154.75 LBS

## 2019-02-28 DIAGNOSIS — G95.20 CERVICAL CORD COMPRESSION WITH MYELOPATHY: ICD-10-CM

## 2019-02-28 DIAGNOSIS — Q28.2 CEREBRAL ARTERIOVENOUS MALFORMATION: ICD-10-CM

## 2019-02-28 DIAGNOSIS — G95.89 MYELOMALACIA OF CERVICAL CORD: ICD-10-CM

## 2019-02-28 DIAGNOSIS — Q28.2 AVM (ARTERIOVENOUS MALFORMATION) BRAIN: Primary | ICD-10-CM

## 2019-02-28 DIAGNOSIS — G44.229 CHRONIC TENSION-TYPE HEADACHE, NOT INTRACTABLE: ICD-10-CM

## 2019-02-28 PROCEDURE — 99999 PR PBB SHADOW E&M-EST. PATIENT-LVL III: ICD-10-PCS | Mod: PBBFAC,,, | Performed by: NEUROLOGICAL SURGERY

## 2019-02-28 PROCEDURE — 72141 MRI CERVICAL SPINE WITHOUT CONTRAST: ICD-10-PCS | Mod: 26,,, | Performed by: RADIOLOGY

## 2019-02-28 PROCEDURE — 72141 MRI NECK SPINE W/O DYE: CPT | Mod: TC

## 2019-02-28 PROCEDURE — 70553 MRI BRAIN STEM W/O & W/DYE: CPT | Mod: TC

## 2019-02-28 PROCEDURE — 99212 PR OFFICE/OUTPT VISIT, EST, LEVL II, 10-19 MIN: ICD-10-PCS | Mod: S$PBB,,, | Performed by: NEUROLOGICAL SURGERY

## 2019-02-28 PROCEDURE — 99213 OFFICE O/P EST LOW 20 MIN: CPT | Mod: PBBFAC,25 | Performed by: NEUROLOGICAL SURGERY

## 2019-02-28 PROCEDURE — 70553 MRI BRAIN W WO CONTRAST: ICD-10-PCS | Mod: 26,,, | Performed by: RADIOLOGY

## 2019-02-28 PROCEDURE — 99999 PR PBB SHADOW E&M-EST. PATIENT-LVL III: CPT | Mod: PBBFAC,,, | Performed by: NEUROLOGICAL SURGERY

## 2019-02-28 PROCEDURE — 25500020 PHARM REV CODE 255: Performed by: NEUROLOGICAL SURGERY

## 2019-02-28 PROCEDURE — 70553 MRI BRAIN STEM W/O & W/DYE: CPT | Mod: 26,,, | Performed by: RADIOLOGY

## 2019-02-28 PROCEDURE — 72141 MRI NECK SPINE W/O DYE: CPT | Mod: 26,,, | Performed by: RADIOLOGY

## 2019-02-28 PROCEDURE — A9585 GADOBUTROL INJECTION: HCPCS | Performed by: NEUROLOGICAL SURGERY

## 2019-02-28 PROCEDURE — 99212 OFFICE O/P EST SF 10 MIN: CPT | Mod: S$PBB,,, | Performed by: NEUROLOGICAL SURGERY

## 2019-02-28 RX ORDER — AMOXICILLIN AND CLAVULANATE POTASSIUM 875; 125 MG/1; MG/1
TABLET, FILM COATED ORAL
COMMUNITY
Start: 2019-02-13

## 2019-02-28 RX ORDER — GADOBUTROL 604.72 MG/ML
7 INJECTION INTRAVENOUS
Status: COMPLETED | OUTPATIENT
Start: 2019-02-28 | End: 2019-02-28

## 2019-02-28 RX ORDER — CYCLOBENZAPRINE HCL 10 MG
10 TABLET ORAL 3 TIMES DAILY
COMMUNITY

## 2019-02-28 RX ADMIN — GADOBUTROL 7 ML: 604.72 INJECTION INTRAVENOUS at 11:02

## 2019-02-28 NOTE — PROGRESS NOTES
History & Physical    I, Zoran Rodriguez, attest that this documentation has been prepared under the direction and in the presence of Rico Marion MD.    03/01/2019    Chief Complaint   Patient presents with    Follow-up       History of Present Illness:  Ida Garduno is a 58 y.o. patient s/p LINAC SRS treatment of grade 3 cerebellar AVM on 8/23/2018. chronic neck pain and cervical myelopathy, chronic back pain. Patient presents for follow up evaluation.     Patient is continuing to complain of intermittent headaches that localize to the occipital and parietal areas. She also continues to complain of constant neck pain that radiates to the left scapular region and changes depending on her sleeping position. Overall, patient's pain symptoms are unchanged from when I initially saw her. Patient's nausea and vomiting from the last time I saw her has been resolved for 3 weeks.    Patient continues to improve from her gait. She continues to have left hand clumsiness (dropping objects from her left hand, having difficulty buttoning buttons with her left hand, has to compensate when cracking eggs for cooking)     Patient continues to smoke. She is now down to smoking 0.5 ppd and is planning to stop completely.      Interval History, dated 11/05/2018:   Ida Garduno is a 58 y.o. patient with history of cerebellar AVM s/p right angiogram and LINAC SRS treatment for AVM, chronic neck pain and cervical myelopathy, chronic back pain. Patient presents for follow up evaluation.      Regarding her balance difficulty, patient feels overall 75% improved after radiosurgery. Patient continues to complain of severe occipital headaches. The headaches come on whenever she has neck pain. Patient states that her headaches were completely resolved temporarily after having halo and local anesthesia over the occiput. Patient reports that the pain radiates down to her left shoulder. Her pain is 5/10 in intensity and is  constant. Patient feels that her left hand still has decreased function compared to her right hand.      Patient is also complaining of nausea and vomiting. Her nausea and vomiting is not related to PO intake.  has noticed that patient has lost about 15 lbs in the last 4 months.      Interval History, dated 6/18/2018:   Ida Garduno is a 57 y.o. patient with history of hypertension, smoking, chronic back pain, chronic neck pain. Patient was referred to me by Dr. Timoteo Negrete MD for evaluation of her cervical spine and lumbar spine. Patient reports her symptoms as headache, neck pain, back pain.      Patient reports constant, daily headache located to her occipital area. Patient denies any positional component or exertional component. She states that her headache has been present for 4 months, with immediate onset after she went for lumbar epidural steroid injections for back pain. Patient denies any valsalva headaches.         Patient also complains of neck pain, described as sharp shooting pain that radiates down her left shoulder and down her left scapular area. Patient denies any hand numbness. No alleviating factors are identifiable by the patient. Exacerbating factors include activities such as mopping and sweeping, which worsen both her neck pain and back pain. Past treatments include physical therapy. Patient has taken Narcotics in the past, but is no longer being prescribed narcotics. Associated signs and symptoms are positive for dropping objects from her left hand only for the last few years, positive for gait instability. Patient denies any falling within the last 6 months but endorses multiple near falls. She denies noticing any sloppier handwriting (patient is right hand dominant). Patient denies any bowel / bladder incontinence.      Patient reports history of previous back surgery in 1999. Patient claims that this was a L4-5 fusion. No history of previous neck surgery. Patient  reports history of smoking (less than 1 ppd).           Review of patient's allergies indicates:   Allergen Reactions    Antihistimine     Codeine      Other reaction(s): Unknown    Methenamine (bulk)     Pheniramine     Shellfish derived Nausea And Vomiting       Current Outpatient Medications   Medication Sig Dispense Refill    ondansetron (ZOFRAN-ODT) 4 MG TbDL       oxyCODONE-acetaminophen (PERCOCET) 7.5-325 mg per tablet       amoxicillin-clavulanate 875-125mg (AUGMENTIN) 875-125 mg per tablet       cyclobenzaprine (FLEXERIL) 10 MG tablet cyclobenzaprine 10 mg tablet      HYDROcodone-acetaminophen (NORCO)  mg per tablet Take 1 tablet by mouth every 8 (eight) hours as needed for Pain.       No current facility-administered medications for this visit.        Past Medical History:   Diagnosis Date    AVM (arteriovenous malformation)        Past Surgical History:   Procedure Laterality Date    Angiogram-Cerebral N/A 8/23/2018    Performed by [facility] at [facility] OR [facility]    APPENDECTOMY      13yrs     HIP ARTHROSCOPY W/ LABRAL REPAIR Right 2016    HYSTERECTOMY  2007    KIDNEY STONE SURGERY  1980    SPINE SURGERY  1999       History reviewed. No pertinent family history.    Social History     Tobacco Use    Smoking status: Current Some Day Smoker     Packs/day: 1.00     Types: Cigarettes     Start date: 6/18/1982    Smokeless tobacco: Current User   Substance Use Topics    Alcohol use: Yes     Alcohol/week: 3.0 oz     Types: 5 Shots of liquor per week     Comment: social     Drug use: No        Review of Systems:  Review of Systems   Constitutional: Negative for activity change.   HENT: Negative for congestion.    Eyes: Negative for discharge.   Respiratory: Negative for apnea.    Cardiovascular: Negative for chest pain.   Gastrointestinal: Negative for abdominal distention.   Endocrine: Negative for cold intolerance.   Genitourinary: Negative for difficulty urinating.  "  Musculoskeletal: Positive for neck pain. Negative for arthralgias.   Neurological: Positive for weakness and headaches. Negative for dizziness.   Psychiatric/Behavioral: Negative for agitation.       Vital Signs (Most Recent)  Temp: 97.8 °F (36.6 °C) (02/28/19 1209)  5' 5" (1.651 m)  70.2 kg (154 lb 12.2 oz)       Physical Exam:  Physical Exam:    Constitutional: She appears well-developed.     Eyes: Pupils are equal, round, and reactive to light. EOM are normal. Right eye exhibits no discharge. Left eye exhibits no discharge.     Abdominal: Soft.     Skin: Skin displays no rash on trunk and no rash on extremities.     Psych/Behavior: She is alert. She is oriented to person, place, and time.     Musculoskeletal:        Neck: There is no tenderness.        Back: Range of motion is full.        Right Upper Extremities: Range of motion is full. Muscle strength is 5/5. Tone is normal.        Left Upper Extremities: Range of motion is full. Tone is normal.       Right Lower Extremities: Range of motion is full. Muscle strength is 5/5. Tone is normal.        Left Lower Extremities: Range of motion is full. Muscle strength is 5/5. Tone is normal.     Neurological:        Coordination: She has an abnormal Romberg Test. She has normal tandem walking coordination.        Sensory: There is no sensory deficit in the trunk. There is no sensory deficit in the extremities.        DTRs: DTRs are DTRS NORMAL AND SYMMETRICnormal and symmetric. Tricep reflexes are 2+ on the right side and 2+ on the left side. Bicep reflexes are 2+ on the right side and 2+ on the left side. Brachioradialis reflexes are 2+ on the right side and 2+ on the left side. Patellar reflexes are 2+ on the right side and 2+ on the left side. Achilles reflexes are 2+ on the right side and 2+ on the left side. She displays no Babinski's sign on the right side. She displays no Babinski's sign on the left side.        Cranial nerves: Cranial nerve(s) II, III, IV, " V, VI, VII, VIII, IX, X, XI and XII are intact.     Mildly positive Romberg.   Normal tandem gait.   Negative Myers's bilaterally.   4/5 triceps on the left side; otherwise, 5/5 strength throughout bilateral upper extremities.   Sensation of bilateral upper extremities is intact to light touch.   Negative clonus bilaterally.   Negative Babinski's bilaterally.     Laboratory  CBC: Reviewed  CMP: Reviewed    Diagnostic Results:  CT: Reviewed  CTA Head, dated 8/23/2018:  Volumetric CT angiogram performed for purposes of radiotherapy localization.    Stable appearance of the known left cerebellar arteriovenous malformation as above.    IR Angiogram Carotid Cerebral Bilateral inc Arch, dated 8/23/2018:  PREOPERATIVE DIAGNOSIS(ES):    Spetzler Oracio grade 3 cerebellar AVM.    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    POSTOPERATIVE DIAGNOSIS(ES):    Same.    Successful angiographic planning for stereotactic radiosurgery.      ASSESSMENT/PLAN:       ICD-10-CM ICD-9-CM   1. AVM (arteriovenous malformation) brain Q28.2 747.81   2. Cervical cord compression with myelopathy G95.20 336.9   3. Chronic tension-type headache, not intractable G44.229 339.12   4. Myelomalacia of cervical cord G95.89 336.8       PLAN:    1. S/p SRS treatment for cerebellar AVM on August 2018. Patient's balance problems are much improved after radiosurgery. Plan will be to follow her up in 6 months with a cerebral angiogram to evaluate the AVM [place].  Patient will then need yearly angiograms until the AVM is resolved.    2. Cervical stenosis with early myelopathy. Patient's   Myelopathic signs have improved from the last time I saw her. At this point, we will continue to follow up conservatively.   We will re-evaluate her in 6 months, we were evaluating her for her AVM.    3. Smoking. I have advised against smoking, and I have explained that smoking cessation will put her in a better shape for possible cervical spine decompression in the  future for treatment of myelopathy.     4. I spent 25 minutes with the patent and family, more than 50% in counseling..     [place] was seen today for follow-up.    Diagnoses and all orders for this visit:    AVM (arteriovenous malformation) brain    Cervical cord compression with myelopathy    Chronic tension-type headache, not intractable    Myelomalacia of cervical cord      I, Dr. Rico Marion, personally performed the services described in this documentation. All medical record entries made by the scribe were at my direction and in my presence.  I have reviewed the chart and agree that the record reflects my personal performance and is accurate and complete. Rico Marion MD.  1:54 PM 03/01/2019

## 2019-07-30 ENCOUNTER — PATIENT MESSAGE (OUTPATIENT)
Dept: NEUROSURGERY | Facility: CLINIC | Age: 59
End: 2019-07-30

## 2019-07-30 ENCOUNTER — TELEPHONE (OUTPATIENT)
Dept: NEUROSURGERY | Facility: CLINIC | Age: 59
End: 2019-07-30

## 2019-07-30 DIAGNOSIS — Q28.2 AVM (ARTERIOVENOUS MALFORMATION) BRAIN: Primary | ICD-10-CM

## 2019-07-30 DIAGNOSIS — Q27.30 AVM (ARTERIOVENOUS MALFORMATION): ICD-10-CM

## 2019-08-26 ENCOUNTER — OFFICE VISIT (OUTPATIENT)
Dept: NEUROSURGERY | Facility: CLINIC | Age: 59
End: 2019-08-26
Payer: MEDICARE

## 2019-08-26 ENCOUNTER — TELEPHONE (OUTPATIENT)
Dept: NEUROSURGERY | Facility: CLINIC | Age: 59
End: 2019-08-26

## 2019-08-26 VITALS
HEIGHT: 65 IN | BODY MASS INDEX: 25.49 KG/M2 | SYSTOLIC BLOOD PRESSURE: 142 MMHG | TEMPERATURE: 98 F | DIASTOLIC BLOOD PRESSURE: 88 MMHG | HEART RATE: 71 BPM | WEIGHT: 153 LBS

## 2019-08-26 DIAGNOSIS — G89.29 CHRONIC NONINTRACTABLE HEADACHE, UNSPECIFIED HEADACHE TYPE: ICD-10-CM

## 2019-08-26 DIAGNOSIS — Q28.2 CEREBRAL ARTERIOVENOUS MALFORMATION (AVM): ICD-10-CM

## 2019-08-26 DIAGNOSIS — R51.9 CHRONIC NONINTRACTABLE HEADACHE, UNSPECIFIED HEADACHE TYPE: ICD-10-CM

## 2019-08-26 DIAGNOSIS — M54.16 LUMBAR RADICULOPATHY: Primary | ICD-10-CM

## 2019-08-26 DIAGNOSIS — G95.20 CERVICAL CORD COMPRESSION WITH MYELOPATHY: ICD-10-CM

## 2019-08-26 PROCEDURE — 99213 OFFICE O/P EST LOW 20 MIN: CPT | Mod: PBBFAC

## 2019-08-26 PROCEDURE — 99999 PR PBB SHADOW E&M-EST. PATIENT-LVL III: ICD-10-PCS | Mod: PBBFAC,,,

## 2019-08-26 PROCEDURE — 99999 PR PBB SHADOW E&M-EST. PATIENT-LVL III: CPT | Mod: PBBFAC,,,

## 2019-08-26 PROCEDURE — 99215 PR OFFICE/OUTPT VISIT, EST, LEVL V, 40-54 MIN: ICD-10-PCS | Mod: S$PBB,,, | Performed by: PSYCHIATRY & NEUROLOGY

## 2019-08-26 PROCEDURE — 99215 OFFICE O/P EST HI 40 MIN: CPT | Mod: S$PBB,,, | Performed by: PSYCHIATRY & NEUROLOGY

## 2019-08-26 NOTE — ASSESSMENT & PLAN NOTE
Occipital headache   - component of Medication overuse headache   - rec to decrease intake of percocet   - will refer to headache clinic here.

## 2019-08-26 NOTE — PROGRESS NOTES
Vascular Neurology  Clinic Note    ___________________  ASSESSMENT & PLAN    Problem List Items Addressed This Visit        Neuro    Cerebral arteriovenous malformation (AVM)    Current Assessment & Plan     Pt had LINAC SRS for cerebellar AVM on 8/2018    - will do follow up cerebral angiogram   - ordered Physical therapy   - rec follow up with headache clinic for throbbing occipital headache         Headache    Current Assessment & Plan     Occipital headache   - component of Medication overuse headache   - rec to decrease intake of percocet   - will refer to headache clinic here.                Reason For Visit (Chief Complaint): Follow up for AVM     HPI: 58 y.o. CF with PMHx of tobacco dependence, cerebellar AVM treated with stereotactic radiosurgery in 8/2018, cervical myelomalacia presented to the clinic for follow up appt.     Per patient she has been stable since her SRS last year. She is ataxic on the left upper and lower extremity and little ataxic on the right UE. Her ataxia has not worsened. Also she has occasional nausea.   Pt has never seen physical therapy. Pt was concerned that her ataxia is not getting better    Pt also reports of occipital headache which is throbbing in nature. Nothing makes her headache better. It is worse in the morning. She takes percocet and flexeril which is not helping her with the headache.      Brain Imaging:    Cerebellar AVM         Vessel Imaging:    CTA done but imaging not available        Other:     Relevant Labwork:        I independently viewed the above imaging studies in addition to reviewing the report.  I reviewed the above labwork.    Review of Systems  Msk: negative for muscle pain  Skin: negative for pruritis  Neuro: negative for headache  All others negative    Past Medical History  Past Medical History:   Diagnosis Date    AVM (arteriovenous malformation)      Family History  No relevant history   Social History  Current smoker    Medication List with  "Changes/Refills   Current Medications    AMOXICILLIN-CLAVULANATE 875-125MG (AUGMENTIN) 875-125 MG PER TABLET        CYCLOBENZAPRINE (FLEXERIL) 10 MG TABLET    cyclobenzaprine 10 mg tablet    HYDROCODONE-ACETAMINOPHEN (NORCO)  MG PER TABLET    Take 1 tablet by mouth every 8 (eight) hours as needed for Pain.    ONDANSETRON (ZOFRAN-ODT) 4 MG TBDL        OXYCODONE-ACETAMINOPHEN (PERCOCET) 7.5-325 MG PER TABLET           EXAM  Vital Signs:Blood pressure (!) 142/88, pulse 71, temperature 97.5 °F (36.4 °C), temperature source Oral, height 5' 5" (1.651 m), weight 69.4 kg (153 lb).  General: well appearing without discomfort   Mental Status:alert, oriented to person - place - age - month   Language: able to name, repeat, comprehend commands   Cranial Nerves: EOMI, PERRL, no facial asymmetry, tongue to midline, palate midline  Motor: 5/5 power in all extremities, normal tone  Sensory: intact light touch bilaterally  Coordination: Left upper and lower Extremity ataxia  Gait & Stance: ataxic gait      Danyel Solorzano MD    "

## 2019-08-26 NOTE — ASSESSMENT & PLAN NOTE
Pt had LINAC SRS for cerebellar AVM on 8/2018    - will do follow up cerebral angiogram   - ordered Physical therapy   - rec follow up with headache clinic for throbbing occipital headache

## 2019-09-11 DIAGNOSIS — Q28.2 AVM (ARTERIOVENOUS MALFORMATION) BRAIN: Primary | ICD-10-CM

## 2019-09-17 ENCOUNTER — TELEPHONE (OUTPATIENT)
Dept: INTERVENTIONAL RADIOLOGY/VASCULAR | Facility: HOSPITAL | Age: 59
End: 2019-09-17

## 2019-09-17 RX ORDER — HEPARIN SODIUM 1000 [USP'U]/ML
3000 INJECTION, SOLUTION INTRAVENOUS; SUBCUTANEOUS ONCE
Status: CANCELLED | OUTPATIENT
Start: 2019-09-17 | End: 2019-09-17

## 2019-09-17 RX ORDER — FENTANYL CITRATE 50 UG/ML
50 INJECTION, SOLUTION INTRAMUSCULAR; INTRAVENOUS
Status: CANCELLED | OUTPATIENT
Start: 2019-09-17

## 2019-09-17 RX ORDER — MIDAZOLAM HYDROCHLORIDE 1 MG/ML
1 INJECTION INTRAMUSCULAR; INTRAVENOUS
Status: CANCELLED | OUTPATIENT
Start: 2019-09-17

## 2019-09-18 ENCOUNTER — HOSPITAL ENCOUNTER (OUTPATIENT)
Facility: HOSPITAL | Age: 59
Discharge: HOME | End: 2019-09-18
Attending: PSYCHIATRY & NEUROLOGY | Admitting: PSYCHIATRY & NEUROLOGY
Payer: MEDICARE

## 2019-09-18 VITALS
HEIGHT: 65 IN | OXYGEN SATURATION: 98 % | WEIGHT: 145 LBS | HEART RATE: 68 BPM | BODY MASS INDEX: 24.16 KG/M2 | DIASTOLIC BLOOD PRESSURE: 80 MMHG | RESPIRATION RATE: 16 BRPM | SYSTOLIC BLOOD PRESSURE: 134 MMHG | TEMPERATURE: 99 F

## 2019-09-18 DIAGNOSIS — Q28.2 AVM (ARTERIOVENOUS MALFORMATION) BRAIN: ICD-10-CM

## 2019-09-18 LAB
ALBUMIN SERPL BCP-MCNC: 4.1 G/DL (ref 3.5–5.2)
ALP SERPL-CCNC: 95 U/L (ref 55–135)
ALT SERPL W/O P-5'-P-CCNC: 15 U/L (ref 10–44)
ANION GAP SERPL CALC-SCNC: 14 MMOL/L (ref 8–16)
AST SERPL-CCNC: 13 U/L (ref 10–40)
BASOPHILS # BLD AUTO: 0.03 K/UL (ref 0–0.2)
BASOPHILS NFR BLD: 0.5 % (ref 0–1.9)
BILIRUB SERPL-MCNC: 0.4 MG/DL (ref 0.1–1)
BUN SERPL-MCNC: 10 MG/DL (ref 6–20)
CALCIUM SERPL-MCNC: 9.1 MG/DL (ref 8.7–10.5)
CHLORIDE SERPL-SCNC: 105 MMOL/L (ref 95–110)
CO2 SERPL-SCNC: 22 MMOL/L (ref 23–29)
CREAT SERPL-MCNC: 0.7 MG/DL (ref 0.5–1.4)
DIFFERENTIAL METHOD: NORMAL
EOSINOPHIL # BLD AUTO: 0.2 K/UL (ref 0–0.5)
EOSINOPHIL NFR BLD: 2.7 % (ref 0–8)
ERYTHROCYTE [DISTWIDTH] IN BLOOD BY AUTOMATED COUNT: 13.3 % (ref 11.5–14.5)
EST. GFR  (AFRICAN AMERICAN): >60 ML/MIN/1.73 M^2
EST. GFR  (NON AFRICAN AMERICAN): >60 ML/MIN/1.73 M^2
GLUCOSE SERPL-MCNC: 87 MG/DL (ref 70–110)
HCT VFR BLD AUTO: 45.6 % (ref 37–48.5)
HGB BLD-MCNC: 15.4 G/DL (ref 12–16)
IMM GRANULOCYTES # BLD AUTO: 0.02 K/UL (ref 0–0.04)
IMM GRANULOCYTES NFR BLD AUTO: 0.4 % (ref 0–0.5)
INR PPP: 0.9 (ref 0.8–1.2)
LYMPHOCYTES # BLD AUTO: 1.5 K/UL (ref 1–4.8)
LYMPHOCYTES NFR BLD: 26.8 % (ref 18–48)
MCH RBC QN AUTO: 30.6 PG (ref 27–31)
MCHC RBC AUTO-ENTMCNC: 33.8 G/DL (ref 32–36)
MCV RBC AUTO: 91 FL (ref 82–98)
MONOCYTES # BLD AUTO: 0.6 K/UL (ref 0.3–1)
MONOCYTES NFR BLD: 9.8 % (ref 4–15)
NEUTROPHILS # BLD AUTO: 3.4 K/UL (ref 1.8–7.7)
NEUTROPHILS NFR BLD: 59.8 % (ref 38–73)
NRBC BLD-RTO: 0 /100 WBC
PLATELET # BLD AUTO: 260 K/UL (ref 150–350)
PMV BLD AUTO: 10.1 FL (ref 9.2–12.9)
POTASSIUM SERPL-SCNC: 4.3 MMOL/L (ref 3.5–5.1)
PROT SERPL-MCNC: 7.4 G/DL (ref 6–8.4)
PROTHROMBIN TIME: 9.8 SEC (ref 9–12.5)
RBC # BLD AUTO: 5.03 M/UL (ref 4–5.4)
SODIUM SERPL-SCNC: 141 MMOL/L (ref 136–145)
WBC # BLD AUTO: 5.63 K/UL (ref 3.9–12.7)

## 2019-09-18 PROCEDURE — 63600175 PHARM REV CODE 636 W HCPCS: Performed by: FAMILY MEDICINE

## 2019-09-18 PROCEDURE — 63600175 PHARM REV CODE 636 W HCPCS: Performed by: PSYCHIATRY & NEUROLOGY

## 2019-09-18 PROCEDURE — 85025 COMPLETE CBC W/AUTO DIFF WBC: CPT

## 2019-09-18 PROCEDURE — 80053 COMPREHEN METABOLIC PANEL: CPT

## 2019-09-18 PROCEDURE — 85610 PROTHROMBIN TIME: CPT

## 2019-09-18 PROCEDURE — 25500020 PHARM REV CODE 255: Performed by: PSYCHIATRY & NEUROLOGY

## 2019-09-18 RX ORDER — SODIUM CHLORIDE 0.9 % (FLUSH) 0.9 %
10 SYRINGE (ML) INJECTION
Status: DISCONTINUED | OUTPATIENT
Start: 2019-09-18 | End: 2019-09-18 | Stop reason: HOSPADM

## 2019-09-18 RX ORDER — FENTANYL CITRATE 50 UG/ML
INJECTION, SOLUTION INTRAMUSCULAR; INTRAVENOUS CODE/TRAUMA/SEDATION MEDICATION
Status: COMPLETED | OUTPATIENT
Start: 2019-09-18 | End: 2019-09-18

## 2019-09-18 RX ORDER — MIDAZOLAM HYDROCHLORIDE 1 MG/ML
INJECTION INTRAMUSCULAR; INTRAVENOUS CODE/TRAUMA/SEDATION MEDICATION
Status: COMPLETED | OUTPATIENT
Start: 2019-09-18 | End: 2019-09-18

## 2019-09-18 RX ORDER — SODIUM CHLORIDE 9 MG/ML
INJECTION, SOLUTION INTRAVENOUS CONTINUOUS
Status: DISCONTINUED | OUTPATIENT
Start: 2019-09-18 | End: 2019-09-18 | Stop reason: HOSPADM

## 2019-09-18 RX ADMIN — MIDAZOLAM HYDROCHLORIDE 1 MG: 1 INJECTION, SOLUTION INTRAMUSCULAR; INTRAVENOUS at 08:09

## 2019-09-18 RX ADMIN — FENTANYL CITRATE 50 MCG: 50 INJECTION, SOLUTION INTRAMUSCULAR; INTRAVENOUS at 08:09

## 2019-09-18 RX ADMIN — SODIUM CHLORIDE: 0.9 INJECTION, SOLUTION INTRAVENOUS at 07:09

## 2019-09-18 RX ADMIN — IOHEXOL 75 ML: 300 INJECTION, SOLUTION INTRAVENOUS at 09:09

## 2019-09-18 NOTE — SEDATION DOCUMENTATION
Cerebral Angiogram complete. Pt tolerated well. VSS. No signs or symptoms of distress noted.  Rt groin 5 Fr Angioseal . Patient to lay flat for 2 hours. HOBN can be elevated at 1115.  Pt will be transferred to ROCU bed and report to be given at bedside.

## 2019-09-18 NOTE — PROGRESS NOTES
MD at bedside. Physical assessment performed. Pt acknowledged understanding of discharge instructions. Pt discharged per unit and hospital protocol via wheel chair with family member.

## 2019-09-18 NOTE — PROGRESS NOTES
Pt C/O pain right leg. Right groin site CDI. VSS. Pedal and dorsalis pulses +2. MD advised. Will continue to monitor.

## 2019-09-18 NOTE — H&P
Radiology History & Physical      SUBJECTIVE:     Chief Complaint: Follow up angio for Cerebellar angio    History of Present Illness:  Ida Garduno is a 59 y.o. female who presents for follow up angiogram for cerebellar AVM treated with SRS done in 8/2018.         Past Medical History:   Diagnosis Date    AVM (arteriovenous malformation)      Past Surgical History:   Procedure Laterality Date    Angiogram-Cerebral N/A 8/23/2018    Performed by [facility] Diagnostic Provider at [facility] OR [facility]    APPENDECTOMY      13yrs     HIP ARTHROSCOPY W/ LABRAL REPAIR Right 2016    HYSTERECTOMY  2007    KIDNEY STONE SURGERY  1980    SPINE SURGERY  1999       Home Meds:   Prior to Admission medications    Medication Sig Start Date End Date Taking? Authorizing Provider   cyclobenzaprine (FLEXERIL) 10 MG tablet cyclobenzaprine 10 mg tablet   Yes Historical Provider, MD   ondansetron (ZOFRAN-ODT) 4 MG TbDL  9/20/18  Yes Historical Provider, MD   oxyCODONE-acetaminophen (PERCOCET) 7.5-325 mg per tablet  10/19/18  Yes Historical Provider, MD   amoxicillin-clavulanate 875-125mg (AUGMENTIN) 875-125 mg per tablet  2/13/19   Historical Provider, MD   HYDROcodone-acetaminophen (NORCO)  mg per tablet Take 1 tablet by mouth every 8 (eight) hours as needed for Pain.    Historical Provider, MD     Anticoagulants/Antiplatelets: no anticoagulation    Allergies:   Review of patient's allergies indicates:   Allergen Reactions    Antihistimine     Codeine      Other reaction(s): Unknown    Methenamine (bulk)     Pheniramine     Shellfish derived Nausea And Vomiting     Sedation History:  no adverse reactions    Review of Systems:   Hematological: no known coagulopathies  Respiratory: no shortness of breath  Cardiovascular: no chest pain  Gastrointestinal: no abdominal pain  Genito-Urinary: no dysuria  Musculoskeletal: negative  Neurological: no TIA or stroke symptoms         OBJECTIVE:     Vital Signs (Most Recent)  Temp:  98.3 °F (36.8 °C) (09/18/19 0716)  Pulse: 90 (09/18/19 0716)  Resp: 16 (09/18/19 0716)  BP: (!) 156/70 (09/18/19 0716)  SpO2: 96 % (09/18/19 0716)    Physical Exam:  ASA: 2   Mallampati: 2    General: no acute distress  Mental Status: alert and oriented to person, place and time  HEENT: normocephalic, atraumatic  Chest: unlabored breathing  Heart: regular heart rate  Abdomen: nondistended  Extremity: moves all extremities    Laboratory  Lab Results   Component Value Date    INR 1.0 08/23/2018       Lab Results   Component Value Date    WBC 7.88 08/23/2018    HGB 15.0 08/23/2018    HCT 45.5 08/23/2018    MCV 97 08/23/2018     08/23/2018      Lab Results   Component Value Date    GLU 88 08/23/2018     08/23/2018    K 4.6 08/23/2018     08/23/2018    CO2 26 08/23/2018    BUN 19 08/23/2018    CREATININE 0.7 08/23/2018    CALCIUM 8.9 08/23/2018    ALT 11 08/23/2018    AST 14 08/23/2018    ALBUMIN 3.5 08/23/2018    BILITOT 0.3 08/23/2018       ASSESSMENT/PLAN:     Sedation Plan: Upto moderate sedation   Patient will undergo Follow up cerebral angiogram    Danyel Solorzano MD  NeuroIR fellow.

## 2019-09-18 NOTE — PROCEDURES
Neurointerventional Radiology Post-Procedure Note    Pre Op Diagnosis: Cerebellar AVM treated with SRS    Post Op Diagnosis: Small residual cerebellar AVM     Procedure: Cerebral angiogram    Procedure performed by: Robb HERBERT, Sarbjit Solorzano MD.    Written Informed Consent Obtained: Yes    Specimen Removed: NO    Estimated Blood Loss: less than 50     Procedure report:     A 5F sheath was placed into the right femoral artery and a 5F Adam catheter was advanced into the aortic arch. Bilateral Internal carotid, and bilateral vertebral arteries as well as left ECA were subselected and angiography of the brain was performed after injection into each of these vessels.    Preliminary interpretation: Residual cerebellar AVM after treating with SRS.  Please see Imaging report for full details.    A right femoral artery angiogram was performed, the sheath removed and hemostasis achieved using 6F angioseal.  No hematoma was present at the time of hemostasis.    The patient tolerated the procedure well.     Danyel Solorzano MD  NeuroInterventional Radiology Fellow

## 2019-09-18 NOTE — PROGRESS NOTES
Pt arrived [facility]. S/P cerebral angiogram. Right groin site CDI. VSS. Bedside handoff report from ISAAC Craven. Family notified.

## 2019-09-18 NOTE — DISCHARGE SUMMARY
Radiology Discharge Summary      Hospital Course: No complications    Admit Date: 9/18/2019  Discharge Date: 09/18/2019     Instructions Given to Patient: Yes  Diet: Resume prior diet  Activity: activity as tolerated    Description of Condition on Discharge: Stable  Vital Signs (Most Recent): Temp: 98.3 °F (36.8 °C) (09/18/19 0716)  Pulse: 77 (09/18/19 0915)  Resp: 16 (09/18/19 0915)  BP: (!) 146/70 (09/18/19 0915)  SpO2: 100 % (09/18/19 0915)    Discharge Disposition: Home    Discharge Diagnosis: Residual Cerebellar AVM treated with SRS     Follow-up: with Dr. Jay Jay Solorzano MD  NeuroIR fellow.

## 2019-09-18 NOTE — DISCHARGE INSTRUCTIONS
For scheduling: Call Elizabeth at [phone]    For questions or concerns call: CARLA MON-FRI 8 AM- 5PM [phone]. Radiology resident on call [phone].    For immediate concerns that are not emergent, you may call our radiology clinic at: [phone]...

## 2019-09-18 NOTE — SEDATION DOCUMENTATION
Pt arrived to Interventional Radiology room 190 via stretcher for Cerebral Angiogram.  Name verified using two identifiers.  Allergies verified.  Will continue to monitor.

## 2019-10-07 ENCOUNTER — OFFICE VISIT (OUTPATIENT)
Dept: NEUROSURGERY | Facility: CLINIC | Age: 59
End: 2019-10-07
Payer: MEDICARE

## 2019-10-07 VITALS
WEIGHT: 149.38 LBS | SYSTOLIC BLOOD PRESSURE: 148 MMHG | BODY MASS INDEX: 24.86 KG/M2 | DIASTOLIC BLOOD PRESSURE: 79 MMHG | HEART RATE: 82 BPM | TEMPERATURE: 98 F

## 2019-10-07 DIAGNOSIS — Q28.2 CEREBRAL ARTERIOVENOUS MALFORMATION (AVM): Primary | ICD-10-CM

## 2019-10-07 PROCEDURE — 99214 OFFICE O/P EST MOD 30 MIN: CPT | Mod: S$PBB,,, | Performed by: PSYCHIATRY & NEUROLOGY

## 2019-10-07 PROCEDURE — 99999 PR PBB SHADOW E&M-EST. PATIENT-LVL III: CPT | Mod: PBBFAC,,,

## 2019-10-07 PROCEDURE — 99214 PR OFFICE/OUTPT VISIT, EST, LEVL IV, 30-39 MIN: ICD-10-PCS | Mod: S$PBB,,, | Performed by: PSYCHIATRY & NEUROLOGY

## 2019-10-07 PROCEDURE — 99999 PR PBB SHADOW E&M-EST. PATIENT-LVL III: ICD-10-PCS | Mod: PBBFAC,,,

## 2019-10-07 PROCEDURE — 99213 OFFICE O/P EST LOW 20 MIN: CPT | Mod: PBBFAC

## 2019-10-07 NOTE — PROGRESS NOTES
[facility]  Neurosurgery Department  Neurointerventional  Clinic Note      Reason for Consult (Chief Complaint): AVM    SUBJECTIVE:     History of Present Illness:  Patient is a 59 y.o. female who presents to clinic today as a follow up after angiogram to evaluate AVM treated with radiation. No new issues. Doing well, but movements on L side and gait are affected.    Past Medical History:   Diagnosis Date    AVM (arteriovenous malformation)      Past Surgical History:   Procedure Laterality Date    APPENDECTOMY      13yrs     CEREBRAL ANGIOGRAM N/A 8/23/2018    Procedure: Angiogram-Cerebral;  Surgeon: [facility] Diagnostic Provider;  Location: [facility] OR [address] [place] [address];  Service: General;  Laterality: N/A;    CEREBRAL ANGIOGRAM N/A 9/18/2019    Procedure: ANGIOGRAM-CEREBRAL;  Surgeon: [facility] Diagnostic Provider;  Location: [facility] OR [address] [place] [address];  Service: Radiology;  Laterality: N/A;  190    HIP ARTHROSCOPY W/ LABRAL REPAIR Right 2016    HIP REPLACEMENT ARTHROPLASTY Right 2016    HYSTERECTOMY  2007    KIDNEY STONE SURGERY  1980    ureteral stent placement    SPINE SURGERY  1999    PLIF of L4/5     History reviewed. No pertinent family history.  Social History     Tobacco Use    Smoking status: Current Some Day Smoker     Packs/day: 1.00     Types: Cigarettes     Start date: 6/18/1982    Smokeless tobacco: Current User   Substance Use Topics    Alcohol use: Yes     Alcohol/week: 5.0 standard drinks     Types: 5 Shots of liquor per week     Comment: social     Drug use: No     Review of patient's allergies indicates:   Allergen Reactions    Antihistimine     Codeine      Other reaction(s): Unknown    Methenamine (bulk)     Pheniramine     Shellfish derived Nausea And Vomiting       Medications:   I have reviewed the current medication administration record.  For a complete list of medications please see the medication list.    Review of Systems:  Constitutional: no fever, no chills, no  fatigue  Eyes: no eyesight worsening, no double vision, no eye pain  ENT/Mouth: no nasal congestion or nose bleeds, no sore throat or hoarseness.  Cardiovascular: no chest pain w/exertion, no palpitations, no leg pain while walking, no irregular heartbeat  Respiratory: no cough or shortness of breath, no coughing up blood  Gastrointestinal: no nausea or vomiting, no abdominal pain or change in bowel habits, no black/bloody stools  Genitourinary: no frequent voiding or blood in urine  Musculoskeletal: no joint pain, no muscle pain   Skin/Breast: negative for rash or skin lesions  Hematologic: no easy bruising  Neurological: no headaches, dizziness, or confusion  Sleep: negative for snoring or breath holding  Psychiatric: no auditory or visual hallucinations, no anxiety, no depression/worrying alot    OBJECTIVE:     Vital Signs (Most Recent):  BP (!) 148/79   Pulse 82   Temp 98.2 °F (36.8 °C) (Oral)   Wt 67.8 kg (149 lb 6.4 oz)   BMI 24.86 kg/m²     Physical Exam:  General: well developed, well nourished  Head/Neck: atraumatic, thyroid normal, no palpable mass  Eyes: fundus normal, no disc edema, no vessel changes  Respiratory: clear to auscultation  Vascular: no carotid bruits  Cardiac: regular rate and rhythm  Abdomen: soft, non-tender    Neurological Exam:   Awake and moving extremities well.  Mild ataxia non gait and L movements.    Laboratory:  BMP:   Lab Results   Component Value Date     09/18/2019    K 4.3 09/18/2019     09/18/2019    CO2 22 (L) 09/18/2019    BUN 10 09/18/2019    CREATININE 0.7 09/18/2019    CALCIUM 9.1 09/18/2019     CBC:   Lab Results   Component Value Date    WBC 5.63 09/18/2019    RBC 5.03 09/18/2019    HGB 15.4 09/18/2019    HCT 45.6 09/18/2019     09/18/2019    MCV 91 09/18/2019    MCH 30.6 09/18/2019    MCHC 33.8 09/18/2019     Lipid Panel: No results found for: CHOL, LDLCALC, HDL, TRIG  Coagulation:   Lab Results   Component Value Date    INR 0.9 09/18/2019      Hgb A1C: No results found for: HGBA1C  TSH: No results found for: TSH    Diagnostic Results:  Brain Imaging: Angio: Cerebellar AVM  ASSESSMENT/PLAN:     Diagnosis:  AVM    Recommendations:  Follow up in February with Dr. Johnson for AVM (radiation)     Sarbjit Zamudio MD  Vascular and Interventional Neurology Staff  Director of [facility]  Departments of Neurology, Radiology & Neurosurgery  [facility]  [phone]

## 2020-03-17 ENCOUNTER — OFFICE VISIT (OUTPATIENT)
Dept: NEUROSURGERY | Facility: CLINIC | Age: 60
End: 2020-03-17
Payer: MEDICARE

## 2020-03-17 DIAGNOSIS — Q28.2 CEREBRAL ARTERIOVENOUS MALFORMATION (AVM): Primary | ICD-10-CM

## 2020-03-17 PROCEDURE — 99213 PR OFFICE/OUTPT VISIT, EST, LEVL III, 20-29 MIN: ICD-10-PCS | Mod: 95,,, | Performed by: NEUROLOGICAL SURGERY

## 2020-03-17 PROCEDURE — 99213 OFFICE O/P EST LOW 20 MIN: CPT | Mod: 95,,, | Performed by: NEUROLOGICAL SURGERY

## 2020-03-17 RX ORDER — METHYLPREDNISOLONE ACETATE 40 MG/ML
40 INJECTION, SUSPENSION INTRA-ARTICULAR; INTRALESIONAL; INTRAMUSCULAR; SOFT TISSUE
Status: CANCELLED | OUTPATIENT
Start: 2020-03-17 | End: 2020-03-17

## 2020-03-17 RX ORDER — METHYLPREDNISOLONE 4 MG/1
TABLET ORAL
Qty: 1 PACKAGE | Refills: 0 | Status: SHIPPED | OUTPATIENT
Start: 2020-03-17 | End: 2020-04-07

## 2020-03-17 NOTE — PROGRESS NOTES
Subjective:   I, Ta Kohli, attest that this documentation has been prepared under the direction and in the presence of Isrrael Johnson MD.    The patient location is: Patient Home  The chief complaint leading to consultation is: AVM  Visit type: Virtual visit with synchronous audio and video  Total time spent with patient: 15 minutes  Each patient to whom he or she provides medical services by telemedicine is:  (1) informed of the relationship between the physician and patient and the respective role of any other health care provider with respect to management of the patient; and (2) notified that he or she may decline to receive medical services by telemedicine and may withdraw from such care at any time.    Patient ID: Ida Garduno is a 59 y.o. female     Chief Complaint: No chief complaint on file.      HPI  The pt is a 59 y.o. year old female with history of tobacco dependence, cerebellar AVM treated with stereotactic radiosurgery in 8/2018, cervical myelomalacia who presents today for evaluation per Dr. Zamudio. Pt was seen by Dr. Garcia in 8/23/2018 for stereotactic radiosurgery. Today, pt states that her balance has worsened. She also notes left leg weakness beginning 6 months ago. She previously endured left arm weakness but her LLE weakness is worse. Pt has received balance training that did provide any significant improvement. She also received strength exams that did not show true weakness. Pt feels that her balance and left leg weakness have worsened. Since her last MRI, pt's  has noticed a change stating that her symptoms noted above have worsened by approximately 20%. Pt has a walker but does not use it when leaving the house. She normally uses a shopping cart or other ambulatory aid.     Review of Systems   Constitutional: Negative for activity change, fatigue, fever and unexpected weight change.   HENT: Negative for facial swelling.    Eyes: Negative.    Respiratory: Negative.     Cardiovascular: Negative.    Gastrointestinal: Negative for diarrhea, nausea and vomiting.   Genitourinary: Negative.    Musculoskeletal: Negative for back pain, joint swelling, myalgias and neck pain.   Neurological: Negative for dizziness, seizures, weakness, numbness and headaches.   Psychiatric/Behavioral: Negative.       Past Medical History:   Diagnosis Date    AVM (arteriovenous malformation)        Objective:    There were no vitals filed for this visit.     Neurological Exam  Mental Status   Oriented to person, place, time and situation. Language is fluent with no aphasia.    Gait  Casual gait: Antalgic gait.  Short steps and decreased arm swing. Antalgic gait.  Coordination  Clumsiness of left hand and slower alternating rapid movements      IMAGING:  IR Angiogram Carotid Cerebral Bilateral inc Arch (9/18/2019): AVM seen and the superior cerebellar region more on the left with feeders coming from distal branches of left superior cerebellar artery and bilateral PICA. No associated aneurysms.    MRI Brain W WO Contrast (2/28/2019): Treated left cerebral AVM is stable in size with probable persistent arteriovenous shunting.      I have personally reviewed the images with the pt.      I, Dr. Isrrael Johnson, personally performed the services described in this documentation. All medical record entries made by the scribe, Ta Kohli, were at my direction and in my presence.  I have reviewed the chart and agree that the record reflects my personal performance and is accurate and complete. Isrrael Johnson MD. 03/17/2020    Assessment:       1. AVM.  2. Adverse radiation effect (radiation necrosis).      Plan:   I have personally reviewed the IR Angiogram with the pt which shows AVM seen and the superior cerebellar region more on the left with feeders coming from distal branches of left superior cerebellar artery and bilateral PICA. No associated aneurysms.    I have personally reviewed the MRI Brain W WO Contrast  with the pt which shows treated left cerebral AVM is stable in size with probable persistent arteriovenous shunting.    I recommend the patient use her walker.    Will prescribe Medrol Dosepak.     Will follow up in 2 weeks via virtual visit for re-evaluation.     Follow up in 2 months with MRI Brain.

## 2020-03-31 ENCOUNTER — OFFICE VISIT (OUTPATIENT)
Dept: NEUROSURGERY | Facility: CLINIC | Age: 60
End: 2020-03-31
Payer: MEDICARE

## 2020-03-31 DIAGNOSIS — Q27.30 AVM (ARTERIOVENOUS MALFORMATION): ICD-10-CM

## 2020-03-31 DIAGNOSIS — Q28.2 CEREBRAL ARTERIOVENOUS MALFORMATION (AVM): Primary | ICD-10-CM

## 2020-03-31 DIAGNOSIS — T66.XXXD ADVERSE EFFECT OF RADIATION, SUBSEQUENT ENCOUNTER: ICD-10-CM

## 2020-03-31 DIAGNOSIS — G95.20 CERVICAL CORD COMPRESSION WITH MYELOPATHY: ICD-10-CM

## 2020-03-31 DIAGNOSIS — G95.89 MYELOMALACIA OF CERVICAL CORD: Primary | ICD-10-CM

## 2020-03-31 PROCEDURE — 99214 PR OFFICE/OUTPT VISIT, EST, LEVL IV, 30-39 MIN: ICD-10-PCS | Mod: 95,,, | Performed by: NEUROLOGICAL SURGERY

## 2020-03-31 PROCEDURE — 99214 OFFICE O/P EST MOD 30 MIN: CPT | Mod: 95,,, | Performed by: NEUROLOGICAL SURGERY

## 2020-03-31 RX ORDER — DEXAMETHASONE 4 MG/1
8 TABLET ORAL EVERY 12 HOURS
Qty: 120 TABLET | Refills: 0 | Status: CANCELLED | OUTPATIENT
Start: 2020-03-31 | End: 2020-04-30

## 2020-03-31 RX ORDER — DEXAMETHASONE 4 MG/1
8 TABLET ORAL EVERY 12 HOURS
Qty: 120 TABLET | Refills: 0 | Status: SHIPPED | OUTPATIENT
Start: 2020-03-31 | End: 2020-04-10

## 2020-03-31 NOTE — PATIENT INSTRUCTIONS
Based on pt's reported improvement, I will prescribe Dexamethasone 8 mg BID. Pt advised to simultaneously take Pepcid to prevent stomach discomfort.     I will schedule the patient for a 2 week virtual visit to [facility], at which time we will consider referring her to physical therapy.

## 2020-03-31 NOTE — PROGRESS NOTES
Subjective:   I, Lisa Andrew, attest that this documentation has been prepared under the direction and in the presence of Isrrael Johnson MD.     Patient ID: Ida Garduno is a 59 y.o. female     Chief Complaint: No chief complaint on file.    The patient location is: Home  The chief complaint leading to consultation is: leg weakness and imbalance   Visit type: Virtual visit with synchronous audio and video  Total time spent with patient: 8  Each patient to whom he or she provides medical services by telemedicine is:  (1) informed of the relationship between the physician and patient and the respective role of any other health care provider with respect to management of the patient; and (2) notified that he or she may decline to receive medical services by telemedicine and may withdraw from such care at any time.        HPI  Ms. Ida Garduno is a pleasant 59 y.o. woman with history of tobacco dependence, cerebellar AVM treated with stereotactic radiosurgery in 8/2018, cervical myelomalacia who presents today for her 2 week follow up after starting a Medrol Dosepak. Pt was recently seen in clinic on 03/17/2020 for evaluation due to having worsening gait imbalance related to left leg weakness that began 6 months ago. She has a hx of left arm weakness, but reported the leg weakness is worse. Pt has received balance training that did not provide any significant improvement. She also received strength exams that did not show true weakness. Pt felt that her balance and left leg weakness have worsened, and her  has noticed a 20% progression in the symptoms noted above. Pt owns a walker but normally ambulates using a shopping cart or other ambulatory aid.  Upon my review, her MRI of the brain showed the treated left cerebral AVM is stable in size with probable persistent arteriovenous shunting. IR Angiogram showed no associated aneurysms.     Pt states she was initially better and able to walk for  two days before symptoms reoccurred. She denies any overt changes in her sleeping patterns while on the medrol dosepak. Symptoms wise, she elaborates that her left leg tends to lag behind and she needs to make a conscious effort to make her leg move.      Review of Systems   Constitutional: Negative for activity change, fatigue, fever and unexpected weight change.   HENT: Negative for facial swelling.    Eyes: Negative.    Respiratory: Negative.    Cardiovascular: Negative.    Gastrointestinal: Negative for diarrhea, nausea and vomiting.   Genitourinary: Negative.    Musculoskeletal: Positive for gait problem (imbalance). Negative for back pain, joint swelling, myalgias and neck pain.   Neurological: Positive for weakness (lagging LLE movement). Negative for dizziness, numbness and headaches.   Psychiatric/Behavioral: Negative.       Past Medical History:   Diagnosis Date    AVM (arteriovenous malformation)        Objective:    There were no vitals filed for this visit.   Physical Exam   Constitutional: She is oriented to person, place, and time. She appears well-developed and well-nourished.   HENT:   Head: Normocephalic and atraumatic.   Neurological: She is alert and oriented to person, place, and time. Gait abnormal.        I, Dr. Isrrael Johnson, personally performed the services described in this documentation. All medical record entries made by the scribe, Lisa Andrew, were at my direction and in my presence.  I have reviewed the chart and agree that the record reflects my personal performance and is accurate and complete. Isrrael Johnson MD. 03/31/2020    Assessment:      1. Cerebral arteriovenous malformation (AVM)    2. AVM (arteriovenous malformation)    3. Adverse effect of radiation, subsequent encounter         Plan:   Based on pt's reported improvement, I will prescribe Dexamethasone 8 mg BID. Pt advised to simultaneously take Pepcid to prevent stomach discomfort.    I will schedule the patient for 2 week  virtual visit to [facility], at which time we will consider referring her to physical therapy.

## 2020-04-14 ENCOUNTER — OFFICE VISIT (OUTPATIENT)
Dept: NEUROSURGERY | Facility: CLINIC | Age: 60
End: 2020-04-14
Payer: MEDICARE

## 2020-04-14 DIAGNOSIS — Q27.30 AVM (ARTERIOVENOUS MALFORMATION): ICD-10-CM

## 2020-04-14 DIAGNOSIS — T66.XXXD ADVERSE EFFECT OF RADIATION, SUBSEQUENT ENCOUNTER: Primary | ICD-10-CM

## 2020-04-14 PROCEDURE — 99214 OFFICE O/P EST MOD 30 MIN: CPT | Mod: 95,,, | Performed by: NEUROLOGICAL SURGERY

## 2020-04-14 PROCEDURE — 99214 PR OFFICE/OUTPT VISIT, EST, LEVL IV, 30-39 MIN: ICD-10-PCS | Mod: 95,,, | Performed by: NEUROLOGICAL SURGERY

## 2020-04-14 NOTE — PROGRESS NOTES
Subjective:   I, Lisa Andrew, attest that this documentation has been prepared under the direction and in the presence of Isrrael Johnson MD.     Patient ID: Ida Garduno is a 59 y.o. female     Chief Complaint: No chief complaint on file.    The patient location is: Home  The chief complaint leading to consultation is: AVM  Visit type: Virtual visit with synchronous audio and video  Total time spent with patient: 5 minutes  Each patient to whom he or she provides medical services by telemedicine is:  (1) informed of the relationship between the physician and patient and the respective role of any other health care provider with respect to management of the patient; and (2) notified that he or she may decline to receive medical services by telemedicine and may withdraw from such care at any time.        HPI  Ms. Ida Garduno is a pleasant 59 y.o. woman with history of tobacco dependence, cerebellar AVM treated with stereotactic radiosurgery in 8/2018 with Dr. Parekh, and radiation necrosis who presents today for 2 week follow up after starting Decadron 8 mg BID. This is a pt who was previously under the care of Dr. Garcia. She was seen in my clinic in March for evaluation of worsening gait imbalance related to left leg weakness that began 6 months ago. She has a hx of left arm weakness, but reported the leg weakness being worse. Pt has received balance training in the past that did not provide any significant improvement and also had strength exams that did not show true weakness. Her  reported a 20% progression in the above symptoms.Upon my review, her MRI of the brain showed the treated left cerebral AVM is stable in size with probable persistent arteriovenous shunting. IR Angiogram showed no associated aneurysms. She was started on medrol Dosepak, and pt reported two days of initial improvement, therefore she was started on Dexamethasone 8 mg BID.    Pt states she was compliant with  Dex until the last day as she could not tolerate it any longer. She states her HR increased to 171 bpm at some point but overall she saw significant improvement in her functional status for three days.    Review of Systems   Constitutional: Negative for activity change, fatigue, fever and unexpected weight change.   HENT: Negative for facial swelling.    Eyes: Negative.    Respiratory: Negative.    Cardiovascular: Negative.    Gastrointestinal: Negative for diarrhea, nausea and vomiting.   Genitourinary: Negative.    Musculoskeletal: Negative for back pain, joint swelling, myalgias and neck pain.   Neurological: Negative for dizziness, weakness, numbness and headaches.   Psychiatric/Behavioral: Negative.       Past Medical History:   Diagnosis Date    AVM (arteriovenous malformation)        Objective:    There were no vitals filed for this visit.   Physical Exam   Constitutional: She is oriented to person, place, and time. She appears well-developed and well-nourished. No distress.   HENT:   Head: Normocephalic and atraumatic.   Neurological: She is alert and oriented to person, place, and time.   She is able to stand, but has difficulty with her balance.          I, Dr. Isrrael Johnson, personally performed the services described in this documentation. All medical record entries made by the scribe, Lisa Andrew, were at my direction and in my presence.  I have reviewed the chart and agree that the record reflects my personal performance and is accurate and complete. Isrrael Johnson MD. 04/14/2020    Assessment:       1. AVM.  2. Radiation necrosis.     Plan:       Due to pt's intolerance to Decadron, I recommend attempting Avastin. My staff will connect the pt with a facility close to her home where she will be able to receive the infusions.    I will schedule the patient for a 1 month follow up.

## 2020-04-14 NOTE — PATIENT INSTRUCTIONS
Due to pt's intolerance to Decadron, I recommend attempting Avastin. My staff will connect the pt with a facility close to her home where she will be able to receive the infusions.    I will schedule the patient for a 1 month follow up.

## 2020-04-16 ENCOUNTER — TELEPHONE (OUTPATIENT)
Dept: NEUROSURGERY | Facility: CLINIC | Age: 60
End: 2020-04-16

## 2020-04-16 DIAGNOSIS — T66.XXXD ADVERSE EFFECT OF RADIATION, SUBSEQUENT ENCOUNTER: Primary | ICD-10-CM

## 2020-04-16 DIAGNOSIS — R51.9 CHRONIC NONINTRACTABLE HEADACHE, UNSPECIFIED HEADACHE TYPE: ICD-10-CM

## 2020-04-16 DIAGNOSIS — G89.29 CHRONIC NONINTRACTABLE HEADACHE, UNSPECIFIED HEADACHE TYPE: ICD-10-CM

## 2020-04-16 NOTE — TELEPHONE ENCOUNTER
Contacted Dr Lj Landry for input on getting Avastin in [place]. His response:        [place] does have an infusion center but I don't know how much non cancer infusions they do.  I really like Dr Camacho Daniel and Dr Karthikeyan Gonzalez with their cancer center.  They might be willing to over see the infusion in the cancer center there??    I will contact the Center.

## 2020-05-14 ENCOUNTER — PATIENT MESSAGE (OUTPATIENT)
Dept: NEUROSURGERY | Facility: CLINIC | Age: 60
End: 2020-05-14

## 2020-05-18 ENCOUNTER — HOSPITAL ENCOUNTER (OUTPATIENT)
Dept: RADIOLOGY | Facility: HOSPITAL | Age: 60
Discharge: HOME | End: 2020-05-18
Attending: NEUROLOGICAL SURGERY
Payer: MEDICARE

## 2020-05-18 DIAGNOSIS — Q28.2 CEREBRAL ARTERIOVENOUS MALFORMATION (AVM): ICD-10-CM

## 2020-05-18 PROCEDURE — A9585 GADOBUTROL INJECTION: HCPCS | Performed by: NEUROLOGICAL SURGERY

## 2020-05-18 PROCEDURE — 70553 MRI BRAIN STEM W/O & W/DYE: CPT | Mod: TC

## 2020-05-18 PROCEDURE — 70553 MRI BRAIN W WO CONTRAST: ICD-10-PCS | Mod: 26,,, | Performed by: RADIOLOGY

## 2020-05-18 PROCEDURE — 25500020 PHARM REV CODE 255: Performed by: NEUROLOGICAL SURGERY

## 2020-05-18 PROCEDURE — 70553 MRI BRAIN STEM W/O & W/DYE: CPT | Mod: 26,,, | Performed by: RADIOLOGY

## 2020-05-18 RX ORDER — GADOBUTROL 604.72 MG/ML
7 INJECTION INTRAVENOUS
Status: COMPLETED | OUTPATIENT
Start: 2020-05-18 | End: 2020-05-18

## 2020-05-18 RX ADMIN — GADOBUTROL 7 ML: 604.72 INJECTION INTRAVENOUS at 02:05

## 2020-05-19 ENCOUNTER — OFFICE VISIT (OUTPATIENT)
Dept: NEUROSURGERY | Facility: CLINIC | Age: 60
End: 2020-05-19
Payer: MEDICARE

## 2020-05-19 DIAGNOSIS — Q27.30 AVM (ARTERIOVENOUS MALFORMATION): Primary | ICD-10-CM

## 2020-05-19 DIAGNOSIS — T66.XXXD ADVERSE EFFECT OF RADIATION, SUBSEQUENT ENCOUNTER: ICD-10-CM

## 2020-05-19 PROCEDURE — 99214 OFFICE O/P EST MOD 30 MIN: CPT | Mod: 95,,, | Performed by: NEUROLOGICAL SURGERY

## 2020-05-19 PROCEDURE — 99214 PR OFFICE/OUTPT VISIT, EST, LEVL IV, 30-39 MIN: ICD-10-PCS | Mod: 95,,, | Performed by: NEUROLOGICAL SURGERY

## 2020-05-19 NOTE — PROGRESS NOTES
Subjective:   I, Lisa Andrew, attest that this documentation has been prepared under the direction and in the presence of Isrrael Johnson MD.     Patient ID: Ida Garduno is a 59 y.o. female     Chief Complaint: No chief complaint on file.        The patient location is: home  The chief complaint leading to consultation is: AVM  Visit type: Virtual visit with synchronous audio and video.  Total time spent with patient: 10 minutes  Each patient to whom he or she provides medical services by telemedicine is:  (1) informed of the relationship between the physician and patient and the respective role of any other health care provider with respect to management of the patient; and (2) notified that he or she may decline to receive medical services by telemedicine and may withdraw from such care at any time.        HPI  Ms. Ida Garduno is a pleasant 59 y.o. woman with history of tobacco dependence, cerebellar AVM treated with stereotactic radiosurgery on 8/2018 with Dr. Parekh, and radiation necrosis who presents today for follow up. This is a pt who was previously under the care of Dr. Garcia. She was seen in my clinic in March 2020 for evaluation of worsening gait imbalance secondary to left leg weakness that began 6 months prior. She has a hx of left arm weakness, but reported the leg weakness being worse. Pt has received balance training in the past that did not provide any significant improvement and also had strength exams that did not show true weakness. Her  reported a 20% progression in the above symptoms.Upon my review, her MRI of the brain showed the treated left cerebral AVM is stable in size with probable persistent arteriovenous shunting. IR Angiogram showed no associated aneurysms. She was started on medrol Dosepak, and pt reported two days of initial improvement, therefore, was started on Dexamethasone 8 mg BID. During her follow up visit, on 04/14/2020, pt endorsed  compliance with dexamethasone until the very last day as she couldn't tolerate it. She noted significant improvement in her functional status for three days. She was connected to [facility] close to her home, in [place], to begin Avastin.     Pt states her functional status is overall stable compared to her last visit with me. She has not yet started Avastin.         Past Medical History:   Diagnosis Date    AVM (arteriovenous malformation)        Objective:    There were no vitals filed for this visit.   Physical Exam   Constitutional: She is oriented to person, place, and time. She appears well-developed and well-nourished. No distress.   HENT:   Head: Normocephalic and atraumatic.   Neurological: She is alert and oriented to person, place, and time.          I, Dr. Isrrael Johnson, personally performed the services described in this documentation. All medical record entries made by the scribe, Lisa Andrew, were at my direction and in my presence.  I have reviewed the chart and agree that the record reflects my personal performance and is accurate and complete. Isrrael Johnson MD. 05/19/2020    Assessment:       1. AVM (arteriovenous malformation)    2. Adverse effect of radiation, subsequent encounter         Plan:       I will schedule the patient for follow up with repeat MRI brain, after she has received three treatments of Avastin. Pt states she will contact us once the third cycle is completed.

## 2020-05-19 NOTE — PATIENT INSTRUCTIONS
I will schedule the patient for follow up with repeat MRI brain, after she has received three treatments of Avastin. Pt states she will contact us once the third cycle is completed.

## 2020-07-13 ENCOUNTER — HOSPITAL ENCOUNTER (OUTPATIENT)
Dept: RADIOLOGY | Facility: HOSPITAL | Age: 60
Discharge: HOME | End: 2020-07-13
Attending: NEUROLOGICAL SURGERY
Payer: MEDICARE

## 2020-07-13 DIAGNOSIS — T66.XXXD ADVERSE EFFECT OF RADIATION, SUBSEQUENT ENCOUNTER: ICD-10-CM

## 2020-07-13 DIAGNOSIS — Q27.30 AVM (ARTERIOVENOUS MALFORMATION): ICD-10-CM

## 2020-07-13 PROCEDURE — 70553 MRI BRAIN STEM W/O & W/DYE: CPT | Mod: TC

## 2020-07-13 PROCEDURE — 70553 MRI BRAIN W WO CONTRAST: ICD-10-PCS | Mod: 26,,, | Performed by: RADIOLOGY

## 2020-07-13 PROCEDURE — 70553 MRI BRAIN STEM W/O & W/DYE: CPT | Mod: 26,,, | Performed by: RADIOLOGY

## 2020-07-13 PROCEDURE — A9585 GADOBUTROL INJECTION: HCPCS | Performed by: NEUROLOGICAL SURGERY

## 2020-07-13 PROCEDURE — 25500020 PHARM REV CODE 255: Performed by: NEUROLOGICAL SURGERY

## 2020-07-13 RX ORDER — GADOBUTROL 604.72 MG/ML
6 INJECTION INTRAVENOUS
Status: COMPLETED | OUTPATIENT
Start: 2020-07-13 | End: 2020-07-13

## 2020-07-13 RX ADMIN — GADOBUTROL 6 ML: 604.72 INJECTION INTRAVENOUS at 03:07

## 2020-07-14 ENCOUNTER — TELEPHONE (OUTPATIENT)
Dept: NEUROSURGERY | Facility: CLINIC | Age: 60
End: 2020-07-14

## 2020-07-14 ENCOUNTER — OFFICE VISIT (OUTPATIENT)
Dept: NEUROSURGERY | Facility: CLINIC | Age: 60
End: 2020-07-14
Payer: MEDICARE

## 2020-07-14 DIAGNOSIS — Q27.30 AVM (ARTERIOVENOUS MALFORMATION): Primary | ICD-10-CM

## 2020-07-14 DIAGNOSIS — Q28.2 CEREBRAL ARTERIOVENOUS MALFORMATION (AVM): Primary | ICD-10-CM

## 2020-07-14 DIAGNOSIS — T66.XXXD ADVERSE EFFECT OF RADIATION, SUBSEQUENT ENCOUNTER: ICD-10-CM

## 2020-07-14 PROCEDURE — 99214 OFFICE O/P EST MOD 30 MIN: CPT | Mod: 95,,, | Performed by: NEUROLOGICAL SURGERY

## 2020-07-14 PROCEDURE — 99214 PR OFFICE/OUTPT VISIT, EST, LEVL IV, 30-39 MIN: ICD-10-PCS | Mod: 95,,, | Performed by: NEUROLOGICAL SURGERY

## 2020-07-14 NOTE — PROGRESS NOTES
Subjective:   I, Lisa Andrew, attest that this documentation has been prepared under the direction and in the presence of Isrrael Johnson MD.     Patient ID: Ida Garduno is a 59 y.o. female     Chief Complaint: No chief complaint on file.      The patient location is: home  The chief complaint leading to consultation is: AVM  Visit type: Virtual visit with synchronous audio and video.  Total time spent with patient: 10 minutes  Each patient to whom he or she provides medical services by telemedicine is:  (1) informed of the relationship between the physician and patient and the respective role of any other health care provider with respect to management of the patient; and (2) notified that he or she may decline to receive medical services by telemedicine and may withdraw from such care at any time.    HPI  Ms. Ida Garduno is a pleasant 59 y.o. woman with a history of tobacco dependence, cerebellar AVM treated with stereotactic radiosurgery on 8/2018 with Dr. Parekh, and radiation necrosis who presents today for 3 month follow up with MRI brain. This is a pt who was previously under the care of Dr. Garcia. She was seen in my clinic in March 2020 for evaluation of worsening gait imbalance secondary to left leg weakness that began 6 months prior. She has a hx of left arm weakness, but reported the leg weakness being worse. Pt has received balance training in the past that did not provide any significant improvement and also had strength exams that did not show true weakness. Her  reported a 20% progression in the above symptoms.Upon my review, her MRI of the brain showed the treated left cerebral AVM was stable in size with probable persistent arteriovenous shunting. IR Angiogram showed no associated aneurysms. She was started on medrol Dosepak, and reported two days of initial improvement, therefore, was started on Dexamethasone 8 mg BID. She noted significant improvement in her  functional status for three days.     Pt is followed by Dr. Gonzalez in [place] and has completed three cycles of Avastin to date. She states she is very pleased with how she is doing. She states she has had headaches different than her usual headaches; they occur mostly in the back of her head on the left side, or behind the eye.        Review of Systems   Constitutional: Negative for activity change, fatigue, fever and unexpected weight change.   HENT: Negative for facial swelling.    Eyes: Negative.    Respiratory: Negative.    Cardiovascular: Negative.    Gastrointestinal: Negative for diarrhea, nausea and vomiting.   Genitourinary: Negative.    Musculoskeletal: Negative for back pain, joint swelling, myalgias and neck pain.   Neurological: Positive for headaches (left eye or back of head). Negative for dizziness, weakness and numbness.   Psychiatric/Behavioral: Negative.       Past Medical History:   Diagnosis Date    AVM (arteriovenous malformation)        Objective:    There were no vitals filed for this visit.   Physical Exam  Constitutional:       General: She is not in acute distress.     Appearance: She is well-developed.   HENT:      Head: Normocephalic and atraumatic.   Neurological:      Mental Status: She is alert and oriented to person, place, and time.         IMAGING:  MRI Brain W WO Contrast (07/13/2020) shows great reduction of the edema with re-expansion of the fluid spaces.    I have personally reviewed the images with the pt.      I, Dr. Isrrael Johnson, personally performed the services described in this documentation. All medical record entries made by the scribe, Lisa Andrew, were at my direction and in my presence.  I have reviewed the chart and agree that the record reflects my personal performance and is accurate and complete. Isrrael Johnson MD. 07/14/2020    Assessment:       1. AVM.  2. Radiation necrosis.     Plan:   I have personally reviewed the MRI brain with the pt which shows great  reduction of the edema with re-expansion of the fluid spaces.    I will schedule the patient for 4 month follow up with MRI brain.

## 2020-07-14 NOTE — PATIENT INSTRUCTIONS
I have personally reviewed the MRI brain with the pt which shows great reduction of the edema with re-expansion of the fluid spaces.    I will schedule the patient for 4 month follow up with MRI brain.

## 2020-11-09 ENCOUNTER — HOSPITAL ENCOUNTER (OUTPATIENT)
Dept: RADIOLOGY | Facility: HOSPITAL | Age: 60
Discharge: HOME | End: 2020-11-09
Attending: NEUROLOGICAL SURGERY
Payer: MEDICARE

## 2020-11-09 DIAGNOSIS — Q28.2 CEREBRAL ARTERIOVENOUS MALFORMATION (AVM): ICD-10-CM

## 2020-11-09 PROCEDURE — 70553 MRI BRAIN W WO CONTRAST: ICD-10-PCS | Mod: 26,,, | Performed by: RADIOLOGY

## 2020-11-09 PROCEDURE — A9585 GADOBUTROL INJECTION: HCPCS | Performed by: NEUROLOGICAL SURGERY

## 2020-11-09 PROCEDURE — 70553 MRI BRAIN STEM W/O & W/DYE: CPT | Mod: 26,,, | Performed by: RADIOLOGY

## 2020-11-09 PROCEDURE — 25500020 PHARM REV CODE 255: Performed by: NEUROLOGICAL SURGERY

## 2020-11-09 PROCEDURE — 70553 MRI BRAIN STEM W/O & W/DYE: CPT | Mod: TC

## 2020-11-09 RX ORDER — GADOBUTROL 604.72 MG/ML
7 INJECTION INTRAVENOUS
Status: COMPLETED | OUTPATIENT
Start: 2020-11-09 | End: 2020-11-09

## 2020-11-09 RX ADMIN — GADOBUTROL 7 ML: 604.72 INJECTION INTRAVENOUS at 03:11

## 2020-11-10 ENCOUNTER — TELEPHONE (OUTPATIENT)
Dept: NEUROSURGERY | Facility: CLINIC | Age: 60
End: 2020-11-10

## 2020-11-10 ENCOUNTER — OFFICE VISIT (OUTPATIENT)
Dept: NEUROSURGERY | Facility: CLINIC | Age: 60
End: 2020-11-10
Payer: MEDICARE

## 2020-11-10 DIAGNOSIS — Q28.2 ARTERIOVENOUS MALFORMATION, CEREBRAL: ICD-10-CM

## 2020-11-10 DIAGNOSIS — Q27.30 AVM (ARTERIOVENOUS MALFORMATION): Primary | ICD-10-CM

## 2020-11-10 PROCEDURE — 99214 OFFICE O/P EST MOD 30 MIN: CPT | Mod: 95,,, | Performed by: NEUROLOGICAL SURGERY

## 2020-11-10 PROCEDURE — 99214 PR OFFICE/OUTPT VISIT, EST, LEVL IV, 30-39 MIN: ICD-10-PCS | Mod: 95,,, | Performed by: NEUROLOGICAL SURGERY

## 2020-11-10 NOTE — PROGRESS NOTES
The patient location is: home  The chief complaint leading to consultation is: AVM    Visit type: audiovisual    Face to Face time with patient: 7 minutes of total time spent on the encounter, which includes face to face time and non-face to face time preparing to see the patient (eg, review of tests), Obtaining and/or reviewing separately obtained history, Documenting clinical information in the electronic or other health record, Independently interpreting results (not separately reported) and communicating results to the patient/family/caregiver, or Care coordination (not separately reported).         Each patient to whom he or she provides medical services by telemedicine is:  (1) informed of the relationship between the physician and patient and the respective role of any other health care provider with respect to management of the patient; and (2) notified that he or she may decline to receive medical services by telemedicine and may withdraw from such care at any time.    Notes:     Subjective:   I, Belen Ames, attest that this documentation has been prepared under the direction and in the presence of Isrrael Johnson MD.     Patient ID: Ida Garduno is a 60 y.o. female     Chief Complaint: No chief complaint on file.        HPI  Ms. Ida Garduno is a pleasant 60 y.o. woman with a history of tobacco dependence, cerebellar AVM treated with stereotactic radiosurgery on 8/2018 with Dr. Parekh, and radiation necrosis who presents today for 4 month follow up with MRI brain. This is a pt who was previously under the care of Dr. Garcia. She was seen in my clinic on 07/14/2020, at that time she stated she has had headaches different than her usual headaches; they occured mostly in the back of her head on the left side, or behind the eye. She also is followed by Dr. Gonzalez in [place] and had completed three cycles of Avastin to date at the last visit.    Pt today states her nausea has been  improving. However, states she is having difficulty ambulating due to her balance. She is not participating in any physical therapy at this time.      Review of Systems   Constitutional: Negative for activity change, fatigue, fever and unexpected weight change.   HENT: Negative for facial swelling.    Eyes: Negative.    Respiratory: Negative.    Cardiovascular: Negative.    Gastrointestinal: Positive for nausea (improving). Negative for diarrhea and vomiting.   Genitourinary: Negative.    Musculoskeletal: Positive for gait problem. Negative for back pain, joint swelling, myalgias and neck pain.   Neurological: Negative for dizziness, weakness, numbness and headaches.   Psychiatric/Behavioral: Negative.       Past Medical History:   Diagnosis Date    AVM (arteriovenous malformation)        Objective:    There were no vitals filed for this visit.   Physical Exam  Constitutional:       General: She is not in acute distress.     Appearance: She is well-developed.   HENT:      Head: Normocephalic and atraumatic.   Neurological:      Mental Status: She is alert and oriented to person, place, and time.            IMAGING:  MRI Brain W WO Contrast (11/09/2020) shows treated left cerebellar arteriovenous malformation with grossly unchanged size and enhancement and minimally decreased surrounding edema in comparison to the prior study dated 07/13/2020.    I have personally reviewed the images with the pt.      I, Dr. Isrrael Johnson, personally performed the services described in this documentation. All medical record entries made by the scribe, Belen Ames, were at my direction and in my presence.  I have reviewed the chart and agree that the record reflects my personal performance and is accurate and complete. Isrrael Johnson MD. 11/10/2020    Assessment:       1. AVM (arteriovenous malformation)    2. Arteriovenous malformation, cerebral         Plan:   I have personally reviewed the MRI brain with the pt which shows  treated left cerebellar arteriovenous malformation with grossly unchanged size and enhancement and minimally decreased surrounding edema in comparison to the prior study dated 07/13/2020.    I recommend for physical therapy with balance training.  I will schedule the patient for a 6 month follow-up with repeat MRI brain and angiogram of the AVM.

## 2020-11-10 NOTE — PATIENT INSTRUCTIONS
I have personally reviewed the MRI brain with the pt which shows treated left cerebellar arteriovenous malformation with grossly unchanged size and enhancement and minimally decreased surrounding edema in comparison to the prior study dated 07/13/2020.    I recommend for physical therapy with balance training.  I will schedule the patient for a 6 month follow-up with repeat MRI brain and angiogram of the AVM.

## 2021-03-17 ENCOUNTER — TELEPHONE (OUTPATIENT)
Dept: NEUROSURGERY | Facility: CLINIC | Age: 61
End: 2021-03-17

## 2021-03-25 ENCOUNTER — PATIENT MESSAGE (OUTPATIENT)
Dept: NEUROSURGERY | Facility: CLINIC | Age: 61
End: 2021-03-25

## 2021-03-25 ENCOUNTER — TELEPHONE (OUTPATIENT)
Dept: NEUROSURGERY | Facility: CLINIC | Age: 61
End: 2021-03-25

## 2021-04-26 RX ORDER — FENTANYL CITRATE 50 UG/ML
50 INJECTION, SOLUTION INTRAMUSCULAR; INTRAVENOUS
Status: CANCELLED | OUTPATIENT
Start: 2021-04-26

## 2021-04-26 RX ORDER — HEPARIN SODIUM 1000 [USP'U]/ML
5000 INJECTION, SOLUTION INTRAVENOUS; SUBCUTANEOUS ONCE
Status: CANCELLED | OUTPATIENT
Start: 2021-04-26 | End: 2021-04-26

## 2021-04-26 RX ORDER — MIDAZOLAM HYDROCHLORIDE 1 MG/ML
1 INJECTION INTRAMUSCULAR; INTRAVENOUS
Status: CANCELLED | OUTPATIENT
Start: 2021-04-26

## 2021-04-30 ENCOUNTER — HOSPITAL ENCOUNTER (OUTPATIENT)
Dept: RADIOLOGY | Facility: HOSPITAL | Age: 61
Discharge: HOME | End: 2021-04-30
Attending: NEUROLOGICAL SURGERY
Payer: MEDICARE

## 2021-04-30 DIAGNOSIS — Q28.2 AVM (ARTERIOVENOUS MALFORMATION) BRAIN: Primary | ICD-10-CM

## 2021-04-30 DIAGNOSIS — Q27.30 AVM (ARTERIOVENOUS MALFORMATION): ICD-10-CM

## 2021-04-30 PROCEDURE — 25500020 PHARM REV CODE 255: Performed by: NEUROLOGICAL SURGERY

## 2021-04-30 PROCEDURE — 70553 MRI BRAIN W WO CONTRAST: ICD-10-PCS | Mod: 26,,, | Performed by: RADIOLOGY

## 2021-04-30 PROCEDURE — 70553 MRI BRAIN STEM W/O & W/DYE: CPT | Mod: TC

## 2021-04-30 PROCEDURE — 70553 MRI BRAIN STEM W/O & W/DYE: CPT | Mod: 26,,, | Performed by: RADIOLOGY

## 2021-04-30 PROCEDURE — A9585 GADOBUTROL INJECTION: HCPCS | Performed by: NEUROLOGICAL SURGERY

## 2021-04-30 RX ORDER — GADOBUTROL 604.72 MG/ML
7 INJECTION INTRAVENOUS
Status: COMPLETED | OUTPATIENT
Start: 2021-04-30 | End: 2021-04-30

## 2021-04-30 RX ORDER — PREDNISONE 50 MG/1
50 TABLET ORAL
Qty: 3 TABLET | Refills: 0 | Status: SHIPPED | OUTPATIENT
Start: 2021-04-30

## 2021-04-30 RX ORDER — DIPHENHYDRAMINE HCL 50 MG
50 CAPSULE ORAL
Qty: 1 CAPSULE | Refills: 0 | Status: SHIPPED | OUTPATIENT
Start: 2021-04-30 | End: 2021-06-09

## 2021-04-30 RX ADMIN — GADOBUTROL 7 ML: 604.72 INJECTION INTRAVENOUS at 02:04

## 2021-05-03 ENCOUNTER — PATIENT MESSAGE (OUTPATIENT)
Dept: NEUROSURGERY | Facility: CLINIC | Age: 61
End: 2021-05-03

## 2021-05-03 ENCOUNTER — HOSPITAL ENCOUNTER (OUTPATIENT)
Dept: INTERVENTIONAL RADIOLOGY/VASCULAR | Facility: HOSPITAL | Age: 61
Discharge: HOME | End: 2021-05-03
Attending: NEUROLOGICAL SURGERY
Payer: MEDICARE

## 2021-05-03 DIAGNOSIS — I67.89 OTHER CEREBROVASCULAR DISEASE: ICD-10-CM

## 2021-05-03 DIAGNOSIS — Q28.2 ARTERIOVENOUS MALFORMATION, CEREBRAL: ICD-10-CM

## 2021-05-03 DIAGNOSIS — Q27.30 AVM (ARTERIOVENOUS MALFORMATION): ICD-10-CM

## 2021-05-03 RX ORDER — SODIUM CHLORIDE 9 MG/ML
INJECTION, SOLUTION INTRAVENOUS CONTINUOUS
Status: DISCONTINUED | OUTPATIENT
Start: 2021-05-03 | End: 2021-05-04 | Stop reason: HOSPADM

## 2021-05-04 ENCOUNTER — PATIENT MESSAGE (OUTPATIENT)
Dept: NEUROSURGERY | Facility: CLINIC | Age: 61
End: 2021-05-04

## 2021-06-01 ENCOUNTER — PATIENT MESSAGE (OUTPATIENT)
Dept: NEUROSURGERY | Facility: CLINIC | Age: 61
End: 2021-06-01

## 2021-06-02 RX ORDER — FENTANYL CITRATE 50 UG/ML
50 INJECTION, SOLUTION INTRAMUSCULAR; INTRAVENOUS
Status: CANCELLED | OUTPATIENT
Start: 2021-06-02

## 2021-06-02 RX ORDER — MIDAZOLAM HYDROCHLORIDE 1 MG/ML
1 INJECTION INTRAMUSCULAR; INTRAVENOUS
Status: CANCELLED | OUTPATIENT
Start: 2021-06-02

## 2021-06-02 RX ORDER — HEPARIN SODIUM 1000 [USP'U]/ML
5000 INJECTION, SOLUTION INTRAVENOUS; SUBCUTANEOUS ONCE
Status: CANCELLED | OUTPATIENT
Start: 2021-06-02 | End: 2021-06-02

## 2021-06-09 ENCOUNTER — TELEPHONE (OUTPATIENT)
Dept: NEUROSURGERY | Facility: CLINIC | Age: 61
End: 2021-06-09

## 2021-06-09 ENCOUNTER — HOSPITAL ENCOUNTER (OUTPATIENT)
Dept: INTERVENTIONAL RADIOLOGY/VASCULAR | Facility: HOSPITAL | Age: 61
Discharge: HOME | End: 2021-06-09
Attending: NEUROLOGICAL SURGERY
Payer: MEDICARE

## 2021-06-09 VITALS
HEIGHT: 65 IN | TEMPERATURE: 97 F | SYSTOLIC BLOOD PRESSURE: 102 MMHG | HEART RATE: 59 BPM | WEIGHT: 130 LBS | BODY MASS INDEX: 21.66 KG/M2 | RESPIRATION RATE: 18 BRPM | DIASTOLIC BLOOD PRESSURE: 57 MMHG | OXYGEN SATURATION: 94 %

## 2021-06-09 DIAGNOSIS — Q27.30 AVM (ARTERIOVENOUS MALFORMATION): ICD-10-CM

## 2021-06-09 DIAGNOSIS — Q28.2 ARTERIOVENOUS MALFORMATION, CEREBRAL: Primary | ICD-10-CM

## 2021-06-09 DIAGNOSIS — I67.89 OTHER CEREBROVASCULAR DISEASE: ICD-10-CM

## 2021-06-09 LAB
ALBUMIN SERPL BCP-MCNC: 3.2 G/DL (ref 3.5–5.2)
ALP SERPL-CCNC: 79 U/L (ref 55–135)
ALT SERPL W/O P-5'-P-CCNC: 11 U/L (ref 10–44)
ANION GAP SERPL CALC-SCNC: 14 MMOL/L (ref 8–16)
ANISOCYTOSIS BLD QL SMEAR: SLIGHT
AST SERPL-CCNC: 11 U/L (ref 10–40)
BASOPHILS # BLD AUTO: 0.05 K/UL (ref 0–0.2)
BASOPHILS NFR BLD: 0.7 % (ref 0–1.9)
BILIRUB SERPL-MCNC: 0.2 MG/DL (ref 0.1–1)
BUN SERPL-MCNC: 28 MG/DL (ref 6–20)
CALCIUM SERPL-MCNC: 9.4 MG/DL (ref 8.7–10.5)
CHLORIDE SERPL-SCNC: 99 MMOL/L (ref 95–110)
CO2 SERPL-SCNC: 25 MMOL/L (ref 23–29)
CREAT SERPL-MCNC: 1.3 MG/DL (ref 0.5–1.4)
DIFFERENTIAL METHOD: NORMAL
EOSINOPHIL # BLD AUTO: 0.2 K/UL (ref 0–0.5)
EOSINOPHIL NFR BLD: 2.9 % (ref 0–8)
ERYTHROCYTE [DISTWIDTH] IN BLOOD BY AUTOMATED COUNT: 13.8 % (ref 11.5–14.5)
EST. GFR  (AFRICAN AMERICAN): 52.3 ML/MIN/1.73 M^2
EST. GFR  (NON AFRICAN AMERICAN): 45.3 ML/MIN/1.73 M^2
GLUCOSE SERPL-MCNC: 97 MG/DL (ref 70–110)
HCT VFR BLD AUTO: 43.2 % (ref 37–48.5)
HGB BLD-MCNC: 14.6 G/DL (ref 12–16)
HYPOCHROMIA BLD QL SMEAR: NORMAL
IMM GRANULOCYTES # BLD AUTO: 0.03 K/UL (ref 0–0.04)
IMM GRANULOCYTES NFR BLD AUTO: 0.4 % (ref 0–0.5)
INR PPP: 1 (ref 0.8–1.2)
LYMPHOCYTES # BLD AUTO: 2.3 K/UL (ref 1–4.8)
LYMPHOCYTES NFR BLD: 30.1 % (ref 18–48)
MCH RBC QN AUTO: 29.6 PG (ref 27–31)
MCHC RBC AUTO-ENTMCNC: 33.8 G/DL (ref 32–36)
MCV RBC AUTO: 88 FL (ref 82–98)
MONOCYTES # BLD AUTO: 0.6 K/UL (ref 0.3–1)
MONOCYTES NFR BLD: 8.3 % (ref 4–15)
NEUTROPHILS # BLD AUTO: 4.3 K/UL (ref 1.8–7.7)
NEUTROPHILS NFR BLD: 57.6 % (ref 38–73)
NRBC BLD-RTO: 0 /100 WBC
OVALOCYTES BLD QL SMEAR: NORMAL
PLATELET # BLD AUTO: 272 K/UL (ref 150–450)
PMV BLD AUTO: 9.8 FL (ref 9.2–12.9)
POIKILOCYTOSIS BLD QL SMEAR: SLIGHT
POLYCHROMASIA BLD QL SMEAR: NORMAL
POTASSIUM SERPL-SCNC: 3.5 MMOL/L (ref 3.5–5.1)
PROT SERPL-MCNC: 6.3 G/DL (ref 6–8.4)
PROTHROMBIN TIME: 10.8 SEC (ref 9–12.5)
RBC # BLD AUTO: 4.93 M/UL (ref 4–5.4)
SODIUM SERPL-SCNC: 138 MMOL/L (ref 136–145)
WBC # BLD AUTO: 7.47 K/UL (ref 3.9–12.7)

## 2021-06-09 PROCEDURE — 85610 PROTHROMBIN TIME: CPT | Performed by: PHYSICIAN ASSISTANT

## 2021-06-09 PROCEDURE — 80053 COMPREHEN METABOLIC PANEL: CPT | Performed by: PHYSICIAN ASSISTANT

## 2021-06-09 PROCEDURE — 85025 COMPLETE CBC W/AUTO DIFF WBC: CPT | Performed by: PHYSICIAN ASSISTANT

## 2021-06-09 PROCEDURE — 25000003 PHARM REV CODE 250: Performed by: FAMILY MEDICINE

## 2021-06-09 RX ORDER — SODIUM CHLORIDE 9 MG/ML
INJECTION, SOLUTION INTRAVENOUS CONTINUOUS
Status: DISCONTINUED | OUTPATIENT
Start: 2021-06-09 | End: 2021-06-10 | Stop reason: HOSPADM

## 2021-06-09 RX ORDER — HYDROCHLOROTHIAZIDE 12.5 MG/1
12.5 CAPSULE ORAL DAILY
COMMUNITY
End: 2021-07-22

## 2021-06-09 RX ORDER — BISOPROLOL FUMARATE AND HYDROCHLOROTHIAZIDE 5; 6.25 MG/1; MG/1
1 TABLET ORAL DAILY
COMMUNITY

## 2021-06-09 RX ADMIN — SODIUM CHLORIDE: 0.9 INJECTION, SOLUTION INTRAVENOUS at 07:06

## 2021-07-07 ENCOUNTER — PATIENT MESSAGE (OUTPATIENT)
Dept: NEUROSURGERY | Facility: CLINIC | Age: 61
End: 2021-07-07

## 2021-07-12 ENCOUNTER — TELEPHONE (OUTPATIENT)
Dept: NEUROSURGERY | Facility: CLINIC | Age: 61
End: 2021-07-12

## 2021-07-13 ENCOUNTER — TELEPHONE (OUTPATIENT)
Dept: INTERVENTIONAL RADIOLOGY/VASCULAR | Facility: HOSPITAL | Age: 61
End: 2021-07-13

## 2021-07-14 ENCOUNTER — TELEPHONE (OUTPATIENT)
Dept: INTERVENTIONAL RADIOLOGY/VASCULAR | Facility: HOSPITAL | Age: 61
End: 2021-07-14

## 2021-07-15 ENCOUNTER — TELEPHONE (OUTPATIENT)
Dept: NEUROSURGERY | Facility: CLINIC | Age: 61
End: 2021-07-15

## 2021-07-15 DIAGNOSIS — Q28.2 ARTERIOVENOUS MALFORMATION, CEREBRAL: ICD-10-CM

## 2021-07-15 DIAGNOSIS — Q27.30 AVM (ARTERIOVENOUS MALFORMATION): Primary | ICD-10-CM

## 2021-07-15 DIAGNOSIS — Z01.818 PRE-OP TESTING: ICD-10-CM

## 2021-07-15 RX ORDER — VERAPAMIL HYDROCHLORIDE 2.5 MG/ML
10 INJECTION, SOLUTION INTRAVENOUS ONCE
Status: CANCELLED | OUTPATIENT
Start: 2021-07-15 | End: 2021-07-15

## 2021-07-15 RX ORDER — MIDAZOLAM HYDROCHLORIDE 1 MG/ML
1 INJECTION INTRAMUSCULAR; INTRAVENOUS
OUTPATIENT
Start: 2021-07-15

## 2021-07-15 RX ORDER — FENTANYL CITRATE 50 UG/ML
50 INJECTION, SOLUTION INTRAMUSCULAR; INTRAVENOUS
OUTPATIENT
Start: 2021-07-15

## 2021-07-15 RX ORDER — HEPARIN SODIUM 1000 [USP'U]/ML
5000 INJECTION, SOLUTION INTRAVENOUS; SUBCUTANEOUS ONCE
OUTPATIENT
Start: 2021-07-15 | End: 2021-07-15

## 2021-07-15 RX ORDER — LIDOCAINE AND PRILOCAINE 25; 25 MG/G; MG/G
CREAM TOPICAL ONCE
Status: CANCELLED | OUTPATIENT
Start: 2021-07-15 | End: 2021-07-15

## 2021-07-19 ENCOUNTER — TELEPHONE (OUTPATIENT)
Dept: NEUROSURGERY | Facility: CLINIC | Age: 61
End: 2021-07-19

## 2021-07-20 ENCOUNTER — TELEPHONE (OUTPATIENT)
Dept: NEUROSURGERY | Facility: CLINIC | Age: 61
End: 2021-07-20

## 2021-07-20 ENCOUNTER — PATIENT MESSAGE (OUTPATIENT)
Dept: INTERVENTIONAL RADIOLOGY/VASCULAR | Facility: HOSPITAL | Age: 61
End: 2021-07-20

## 2021-07-20 DIAGNOSIS — Z01.818 PRE-OP TESTING: Primary | ICD-10-CM

## 2021-07-20 DIAGNOSIS — N28.9 KIDNEY DISEASE: Primary | ICD-10-CM

## 2021-07-21 ENCOUNTER — PATIENT MESSAGE (OUTPATIENT)
Dept: INTERVENTIONAL RADIOLOGY/VASCULAR | Facility: HOSPITAL | Age: 61
End: 2021-07-21

## 2021-07-21 ENCOUNTER — TELEPHONE (OUTPATIENT)
Dept: INTERVENTIONAL RADIOLOGY/VASCULAR | Facility: HOSPITAL | Age: 61
End: 2021-07-21

## 2021-07-22 ENCOUNTER — PATIENT MESSAGE (OUTPATIENT)
Dept: NEUROSURGERY | Facility: CLINIC | Age: 61
End: 2021-07-22

## 2021-07-22 ENCOUNTER — HOSPITAL ENCOUNTER (OUTPATIENT)
Dept: INTERVENTIONAL RADIOLOGY/VASCULAR | Facility: HOSPITAL | Age: 61
Discharge: HOME | End: 2021-07-22
Attending: NEUROLOGICAL SURGERY
Payer: MEDICARE

## 2021-07-22 ENCOUNTER — TELEPHONE (OUTPATIENT)
Dept: NEUROSURGERY | Facility: CLINIC | Age: 61
End: 2021-07-22

## 2021-07-22 VITALS
DIASTOLIC BLOOD PRESSURE: 53 MMHG | BODY MASS INDEX: 21.66 KG/M2 | TEMPERATURE: 97 F | RESPIRATION RATE: 17 BRPM | WEIGHT: 130 LBS | HEIGHT: 65 IN | OXYGEN SATURATION: 98 % | HEART RATE: 60 BPM | SYSTOLIC BLOOD PRESSURE: 95 MMHG

## 2021-07-22 DIAGNOSIS — Q27.30 AVM (ARTERIOVENOUS MALFORMATION): ICD-10-CM

## 2021-07-22 DIAGNOSIS — Q28.2 ARTERIOVENOUS MALFORMATION, CEREBRAL: ICD-10-CM

## 2021-07-22 LAB
ANION GAP SERPL CALC-SCNC: 11 MMOL/L (ref 8–16)
BUN SERPL-MCNC: 24 MG/DL (ref 6–20)
CALCIUM SERPL-MCNC: 9 MG/DL (ref 8.7–10.5)
CHLORIDE SERPL-SCNC: 96 MMOL/L (ref 95–110)
CO2 SERPL-SCNC: 28 MMOL/L (ref 23–29)
CREAT SERPL-MCNC: 1.2 MG/DL (ref 0.5–1.4)
EST. GFR  (AFRICAN AMERICAN): 57.6 ML/MIN/1.73 M^2
EST. GFR  (NON AFRICAN AMERICAN): 49.9 ML/MIN/1.73 M^2
GLUCOSE SERPL-MCNC: 94 MG/DL (ref 70–110)
POTASSIUM SERPL-SCNC: 3.7 MMOL/L (ref 3.5–5.1)
SODIUM SERPL-SCNC: 135 MMOL/L (ref 136–145)

## 2021-07-22 PROCEDURE — 25000003 PHARM REV CODE 250: Performed by: NEUROLOGICAL SURGERY

## 2021-07-22 PROCEDURE — 25500020 PHARM REV CODE 255: Performed by: NEUROLOGICAL SURGERY

## 2021-07-22 PROCEDURE — C1894 INTRO/SHEATH, NON-LASER: HCPCS

## 2021-07-22 PROCEDURE — 63600175 PHARM REV CODE 636 W HCPCS: Performed by: NEUROLOGICAL SURGERY

## 2021-07-22 PROCEDURE — 36224 PLACE CATH CAROTD ART: CPT | Mod: 50 | Performed by: NEUROLOGICAL SURGERY

## 2021-07-22 PROCEDURE — 99213 PR OFFICE/OUTPT VISIT, EST, LEVL III, 20-29 MIN: ICD-10-PCS | Mod: ,,, | Performed by: NEUROLOGICAL SURGERY

## 2021-07-22 PROCEDURE — 99499 NO LOS: ICD-10-PCS | Mod: ,,, | Performed by: NEUROLOGICAL SURGERY

## 2021-07-22 PROCEDURE — 99213 OFFICE O/P EST LOW 20 MIN: CPT | Mod: ,,, | Performed by: NEUROLOGICAL SURGERY

## 2021-07-22 PROCEDURE — 36226 PLACE CATH VERTEBRAL ART: CPT | Mod: 50 | Performed by: NEUROLOGICAL SURGERY

## 2021-07-22 PROCEDURE — 36227 PLACE CATH XTRNL CAROTID: CPT | Mod: 50 | Performed by: NEUROLOGICAL SURGERY

## 2021-07-22 PROCEDURE — 80048 BASIC METABOLIC PNL TOTAL CA: CPT | Performed by: PHYSICIAN ASSISTANT

## 2021-07-22 PROCEDURE — 99499 UNLISTED E&M SERVICE: CPT | Mod: ,,, | Performed by: NEUROLOGICAL SURGERY

## 2021-07-22 RX ORDER — FENTANYL CITRATE 50 UG/ML
INJECTION, SOLUTION INTRAMUSCULAR; INTRAVENOUS CODE/TRAUMA/SEDATION MEDICATION
Status: COMPLETED | OUTPATIENT
Start: 2021-07-22 | End: 2021-07-22

## 2021-07-22 RX ORDER — HEPARIN SODIUM 1000 [USP'U]/ML
INJECTION, SOLUTION INTRAVENOUS; SUBCUTANEOUS CODE/TRAUMA/SEDATION MEDICATION
Status: COMPLETED | OUTPATIENT
Start: 2021-07-22 | End: 2021-07-22

## 2021-07-22 RX ORDER — VERAPAMIL HYDROCHLORIDE 2.5 MG/ML
INJECTION, SOLUTION INTRAVENOUS CODE/TRAUMA/SEDATION MEDICATION
Status: COMPLETED | OUTPATIENT
Start: 2021-07-22 | End: 2021-07-22

## 2021-07-22 RX ORDER — IODIXANOL 320 MG/ML
400 INJECTION, SOLUTION INTRAVASCULAR
Status: COMPLETED | OUTPATIENT
Start: 2021-07-22 | End: 2021-07-22

## 2021-07-22 RX ORDER — MIDAZOLAM HYDROCHLORIDE 1 MG/ML
INJECTION INTRAMUSCULAR; INTRAVENOUS CODE/TRAUMA/SEDATION MEDICATION
Status: COMPLETED | OUTPATIENT
Start: 2021-07-22 | End: 2021-07-22

## 2021-07-22 RX ORDER — SODIUM CHLORIDE 9 MG/ML
INJECTION, SOLUTION INTRAVENOUS CONTINUOUS
Status: DISCONTINUED | OUTPATIENT
Start: 2021-07-22 | End: 2021-07-23 | Stop reason: HOSPADM

## 2021-07-22 RX ORDER — LIDOCAINE AND PRILOCAINE 25; 25 MG/G; MG/G
CREAM TOPICAL CODE/TRAUMA/SEDATION MEDICATION
Status: COMPLETED | OUTPATIENT
Start: 2021-07-22 | End: 2021-07-22

## 2021-07-22 RX ORDER — OXYCODONE HYDROCHLORIDE 15 MG/1
10 TABLET ORAL EVERY 6 HOURS PRN
COMMUNITY

## 2021-07-22 RX ADMIN — LIDOCAINE AND PRILOCAINE 1 G: 25; 25 CREAM TOPICAL at 10:07

## 2021-07-22 RX ADMIN — MIDAZOLAM HYDROCHLORIDE 0.5 MG: 1 INJECTION INTRAMUSCULAR; INTRAVENOUS at 10:07

## 2021-07-22 RX ADMIN — IODIXANOL 140 ML: 320 INJECTION, SOLUTION INTRAVASCULAR at 12:07

## 2021-07-22 RX ADMIN — VERAPAMIL HYDROCHLORIDE 10 MG: 2.5 INJECTION, SOLUTION INTRAVENOUS at 11:07

## 2021-07-22 RX ADMIN — HEPARIN SODIUM 1500 UNITS: 1000 INJECTION, SOLUTION INTRAVENOUS; SUBCUTANEOUS at 11:07

## 2021-07-22 RX ADMIN — MIDAZOLAM HYDROCHLORIDE 1 MG: 1 INJECTION INTRAMUSCULAR; INTRAVENOUS at 10:07

## 2021-07-22 RX ADMIN — FENTANYL CITRATE 25 MCG: 50 INJECTION, SOLUTION INTRAMUSCULAR; INTRAVENOUS at 10:07

## 2021-07-22 RX ADMIN — MIDAZOLAM HYDROCHLORIDE 0.5 MG: 1 INJECTION INTRAMUSCULAR; INTRAVENOUS at 11:07

## 2021-07-22 RX ADMIN — FENTANYL CITRATE 25 MCG: 50 INJECTION, SOLUTION INTRAMUSCULAR; INTRAVENOUS at 11:07

## 2021-07-22 RX ADMIN — FENTANYL CITRATE 50 MCG: 50 INJECTION, SOLUTION INTRAMUSCULAR; INTRAVENOUS at 10:07

## 2021-08-09 ENCOUNTER — OFFICE VISIT (OUTPATIENT)
Dept: NEUROSURGERY | Facility: CLINIC | Age: 61
End: 2021-08-09
Payer: MEDICARE

## 2021-08-09 ENCOUNTER — TELEPHONE (OUTPATIENT)
Dept: NEUROSURGERY | Facility: CLINIC | Age: 61
End: 2021-08-09

## 2021-08-09 DIAGNOSIS — Q28.2 CEREBRAL ARTERIOVENOUS MALFORMATION (AVM): ICD-10-CM

## 2021-08-09 DIAGNOSIS — Q28.2 ARTERIOVENOUS MALFORMATION, CEREBRAL: ICD-10-CM

## 2021-08-09 DIAGNOSIS — Q27.30 AVM (ARTERIOVENOUS MALFORMATION): Primary | ICD-10-CM

## 2021-08-09 PROCEDURE — 99215 OFFICE O/P EST HI 40 MIN: CPT | Mod: 95,,, | Performed by: NEUROLOGICAL SURGERY

## 2021-08-09 PROCEDURE — 99215 PR OFFICE/OUTPT VISIT, EST, LEVL V, 40-54 MIN: ICD-10-PCS | Mod: 95,,, | Performed by: NEUROLOGICAL SURGERY

## 2021-08-13 ENCOUNTER — TELEPHONE (OUTPATIENT)
Dept: NEUROSURGERY | Facility: CLINIC | Age: 61
End: 2021-08-13

## 2021-08-13 DIAGNOSIS — Q28.2 AVM (ARTERIOVENOUS MALFORMATION) BRAIN: ICD-10-CM

## 2021-08-13 DIAGNOSIS — Q27.30 AVM (ARTERIOVENOUS MALFORMATION): ICD-10-CM

## 2021-08-13 DIAGNOSIS — Z01.818 PRE-OP TESTING: Primary | ICD-10-CM

## 2021-09-03 ENCOUNTER — PATIENT MESSAGE (OUTPATIENT)
Dept: NEUROSURGERY | Facility: CLINIC | Age: 61
End: 2021-09-03

## 2022-06-06 ENCOUNTER — PATIENT MESSAGE (OUTPATIENT)
Dept: NEUROSURGERY | Facility: CLINIC | Age: 62
End: 2022-06-06
Payer: MEDICARE

## 2022-06-08 ENCOUNTER — TELEPHONE (OUTPATIENT)
Dept: NEUROSURGERY | Facility: CLINIC | Age: 62
End: 2022-06-08
Payer: MEDICARE

## 2022-06-08 ENCOUNTER — PATIENT MESSAGE (OUTPATIENT)
Dept: NEUROSURGERY | Facility: CLINIC | Age: 62
End: 2022-06-08
Payer: MEDICARE

## 2022-06-08 DIAGNOSIS — Q27.30 AVM (ARTERIOVENOUS MALFORMATION): Primary | ICD-10-CM

## 2022-06-09 ENCOUNTER — HOSPITAL ENCOUNTER (OUTPATIENT)
Dept: RADIOLOGY | Facility: HOSPITAL | Age: 62
Discharge: HOME | End: 2022-06-09
Attending: NEUROLOGICAL SURGERY
Payer: MEDICARE

## 2022-06-09 DIAGNOSIS — Q27.30 AVM (ARTERIOVENOUS MALFORMATION): ICD-10-CM

## 2022-06-09 PROCEDURE — 70553 MRI BRAIN STEM W/O & W/DYE: CPT | Mod: TC

## 2022-06-09 PROCEDURE — A9585 GADOBUTROL INJECTION: HCPCS | Performed by: NEUROLOGICAL SURGERY

## 2022-06-09 PROCEDURE — 70553 MRI BRAIN W WO CONTRAST: ICD-10-PCS | Mod: 26,,, | Performed by: RADIOLOGY

## 2022-06-09 PROCEDURE — 25500020 PHARM REV CODE 255: Performed by: NEUROLOGICAL SURGERY

## 2022-06-09 PROCEDURE — 70553 MRI BRAIN STEM W/O & W/DYE: CPT | Mod: 26,,, | Performed by: RADIOLOGY

## 2022-06-09 RX ORDER — GADOBUTROL 604.72 MG/ML
6 INJECTION INTRAVENOUS
Status: COMPLETED | OUTPATIENT
Start: 2022-06-09 | End: 2022-06-09

## 2022-06-09 RX ADMIN — GADOBUTROL 6 ML: 604.72 INJECTION INTRAVENOUS at 03:06

## 2022-06-13 ENCOUNTER — PATIENT MESSAGE (OUTPATIENT)
Dept: NEUROSURGERY | Facility: CLINIC | Age: 62
End: 2022-06-13
Payer: MEDICARE

## 2022-06-21 ENCOUNTER — OFFICE VISIT (OUTPATIENT)
Dept: NEUROSURGERY | Facility: CLINIC | Age: 62
End: 2022-06-21
Payer: MEDICARE

## 2022-06-21 DIAGNOSIS — T66.XXXD ADVERSE EFFECT OF RADIATION, SUBSEQUENT ENCOUNTER: ICD-10-CM

## 2022-06-21 DIAGNOSIS — Q27.30 AVM (ARTERIOVENOUS MALFORMATION): Primary | ICD-10-CM

## 2022-06-21 PROCEDURE — 99214 PR OFFICE/OUTPT VISIT, EST, LEVL IV, 30-39 MIN: ICD-10-PCS | Mod: 95,,, | Performed by: NEUROLOGICAL SURGERY

## 2022-06-21 PROCEDURE — 99214 OFFICE O/P EST MOD 30 MIN: CPT | Mod: 95,,, | Performed by: NEUROLOGICAL SURGERY

## 2022-06-21 NOTE — PROGRESS NOTES
The patient location is: home  The chief complaint leading to consultation is: follow up    Visit type: audiovisual    Face to Face time with patient: 10 minutes of total time spent on the encounter, which includes face to face time and non-face to face time preparing to see the patient (eg, review of tests), Obtaining and/or reviewing separately obtained history, Documenting clinical information in the electronic or other health record, Independently interpreting results (not separately reported) and communicating results to the patient/family/caregiver, or Care coordination (not separately reported).     Each patient to whom he or she provides medical services by telemedicine is:  (1) informed of the relationship between the physician and patient and the respective role of any other health care provider with respect to management of the patient; and (2) notified that he or she may decline to receive medical services by telemedicine and may withdraw from such care at any time.    Subjective:   I, Alexandria Lewis, attest that this documentation has been prepared under the direction and in the presence of Isrrael Johnson MD.     Patient ID: Ida Garduno is a 61 y.o. female     Chief Complaint: No chief complaint on file.        HPI  MsLoni Garduno is a 61 y.o. woman with a history of cerebellar AVM treated with stereotactic radiosurgery on 8/2018 with Dr. Parekh, and radiation necrosis who presents today for a follow up with MRI brain.    This is a pt who had undergone stereotactic, radiosurgical treatment. There was evidence of residual on previous cerebral angiogram.  MRI was suggestive of progressive and persistent radiation necrosis. Therefore, pt underwent a diagnostic cerebral angiogram to elucidate the angiographic architecture of her treated cerebellar AVM. She was seen by Dr. Beltrán on 8/9/2021 and reported headaches, nausea, and vomiting since her angiogram.  She also noted some  dizziness, brain fog, as well as some intermittent gait abnormality.     Today the pt reports she continues to have gait instability that has gotten progressively worse within the last few months. The pt notes almost falling on concrete a few days ago, however her son was present at the time to stabilize her. Patient participated physical therapy 3x a week for 3 weeks and states it was not helpful because she did not undergo balance training. Pt questioned her therapist in regards to her balance but was unable to get a finite response.    Review of Systems   Constitutional: Negative for activity change, appetite change, fatigue, fever and unexpected weight change.   HENT: Negative for facial swelling.    Eyes: Negative.    Respiratory: Negative.    Cardiovascular: Negative.    Gastrointestinal: Negative for diarrhea, nausea and vomiting.   Endocrine: Negative.    Genitourinary: Negative.    Musculoskeletal: Positive for gait problem. Negative for back pain, joint swelling, myalgias and neck pain.   Neurological: Negative for dizziness, seizures, weakness, numbness and headaches.   Psychiatric/Behavioral: Negative.       Past Medical History:   Diagnosis Date    AVM (arteriovenous malformation)     Hypertension        Objective:      Physical Exam  Constitutional:       General: She is not in acute distress.     Appearance: Normal appearance.   HENT:      Head: Normocephalic and atraumatic.   Neurological:      Mental Status: She is alert and oriented to person, place, and time.          IMAGING:  MRI Brain W WO Contrast (5/19/2022)  1. Treated left cerebellar AVM not significantly changed over the interval.  2. Mild cortical atrophy with periventricular deep white matter change consistent with chronic small vessel ischemic disease.  3. Right mastoid effusion.    I have personally reviewed the images with the pt.      I, Dr. Isrrael Johnson, personally performed the services described in this documentation. All medical  record entries made by the scribe, Alexandria Lewis, were at my direction and in my presence.  I have reviewed the chart and agree that the record reflects my personal performance and is accurate and complete. Isrrael Johnson MD. 06/21/2022    Assessment:       AVM s/p Radiosurgery     Plan:   I have personally reviewed the MRI brain with the pt which shows:  1. Treated left cerebellar AVM not significantly changed over the interval.  2. Mild cortical atrophy with periventricular deep white matter change consistent with chronic small vessel ischemic disease.  3. Right mastoid effusion.    I will refer the patient for a physical medicine consult.    Pt is advised to contact us as needed.

## 2022-06-21 NOTE — PATIENT INSTRUCTIONS
I have personally reviewed the MRI brain with the pt which shows:  1. Treated left cerebellar AVM not significantly changed over the interval.  2. Mild cortical atrophy with periventricular deep white matter change consistent with chronic small vessel ischemic disease.  3. Right mastoid effusion.    I will refer the patient for a physical medicine consult.    Pt is advised to contact us as needed.